# Patient Record
Sex: MALE | Race: WHITE | NOT HISPANIC OR LATINO | Employment: OTHER | URBAN - METROPOLITAN AREA
[De-identification: names, ages, dates, MRNs, and addresses within clinical notes are randomized per-mention and may not be internally consistent; named-entity substitution may affect disease eponyms.]

---

## 2023-04-04 DIAGNOSIS — K21.9 GASTROESOPHAGEAL REFLUX DISEASE WITHOUT ESOPHAGITIS: Primary | ICD-10-CM

## 2023-04-04 RX ORDER — DULOXETIN HYDROCHLORIDE 30 MG/1
1 CAPSULE, DELAYED RELEASE ORAL DAILY
COMMUNITY
Start: 2021-03-23 | End: 2023-06-29 | Stop reason: SDUPTHER

## 2023-04-04 RX ORDER — UBIDECARENONE 30 MG
1 CAPSULE ORAL DAILY
COMMUNITY
Start: 2019-09-19

## 2023-04-04 RX ORDER — CYCLOBENZAPRINE HCL 10 MG
10 TABLET ORAL NIGHTLY
COMMUNITY
Start: 2023-01-12 | End: 2023-12-27 | Stop reason: ALTCHOICE

## 2023-04-04 RX ORDER — EZETIMIBE 10 MG/1
1 TABLET ORAL NIGHTLY
COMMUNITY
Start: 2018-10-16 | End: 2024-01-10 | Stop reason: ALTCHOICE

## 2023-04-04 RX ORDER — PANTOPRAZOLE SODIUM 40 MG/1
1 TABLET, DELAYED RELEASE ORAL DAILY
COMMUNITY
Start: 2019-12-27 | End: 2023-04-04 | Stop reason: SDUPTHER

## 2023-04-04 RX ORDER — FLUTICASONE PROPIONATE 250 UG/1
POWDER, METERED RESPIRATORY (INHALATION)
COMMUNITY
Start: 2022-09-26 | End: 2024-01-10 | Stop reason: ALTCHOICE

## 2023-04-04 RX ORDER — AMLODIPINE BESYLATE 2.5 MG/1
1 TABLET ORAL DAILY
COMMUNITY
Start: 2015-06-03 | End: 2024-03-21

## 2023-04-04 RX ORDER — ARMODAFINIL 200 MG/1
1 TABLET ORAL NIGHTLY
COMMUNITY
End: 2023-12-27 | Stop reason: ALTCHOICE

## 2023-04-04 RX ORDER — MULTIVITAMIN
1 TABLET ORAL DAILY
COMMUNITY
End: 2023-12-27 | Stop reason: SDUPTHER

## 2023-04-04 RX ORDER — PRAVASTATIN SODIUM 80 MG/1
1 TABLET ORAL NIGHTLY
COMMUNITY
Start: 2019-10-31 | End: 2023-11-27 | Stop reason: SDUPTHER

## 2023-04-04 RX ORDER — TAMSULOSIN HYDROCHLORIDE 0.4 MG/1
1 CAPSULE ORAL DAILY
COMMUNITY
Start: 2018-10-16 | End: 2023-05-15 | Stop reason: SDUPTHER

## 2023-04-04 RX ORDER — EPINEPHRINE 0.22MG
AEROSOL WITH ADAPTER (ML) INHALATION
COMMUNITY
End: 2023-12-27 | Stop reason: SDUPTHER

## 2023-04-04 RX ORDER — ASPIRIN 81 MG/1
TABLET ORAL
COMMUNITY

## 2023-04-04 RX ORDER — DICYCLOMINE HYDROCHLORIDE 10 MG/1
CAPSULE ORAL
COMMUNITY
End: 2023-12-27 | Stop reason: ALTCHOICE

## 2023-04-04 RX ORDER — NAPROXEN 500 MG/1
1 TABLET ORAL 2 TIMES DAILY
COMMUNITY
Start: 2021-09-24 | End: 2024-01-10 | Stop reason: ALTCHOICE

## 2023-04-04 RX ORDER — IPRATROPIUM BROMIDE 21 UG/1
SPRAY, METERED NASAL
COMMUNITY
Start: 2022-10-12 | End: 2023-12-27 | Stop reason: ALTCHOICE

## 2023-04-04 RX ORDER — LORAZEPAM 1 MG/1
TABLET ORAL
COMMUNITY
Start: 2022-06-29 | End: 2023-12-27 | Stop reason: ALTCHOICE

## 2023-04-04 RX ORDER — ALIROCUMAB 75 MG/ML
INJECTION, SOLUTION SUBCUTANEOUS
COMMUNITY
Start: 2023-03-21 | End: 2024-01-10 | Stop reason: SDUPTHER

## 2023-04-04 RX ORDER — ALBUTEROL SULFATE 90 UG/1
AEROSOL, METERED RESPIRATORY (INHALATION)
COMMUNITY
Start: 2019-01-08 | End: 2023-12-27 | Stop reason: ALTCHOICE

## 2023-04-05 RX ORDER — PANTOPRAZOLE SODIUM 40 MG/1
40 TABLET, DELAYED RELEASE ORAL DAILY
Qty: 90 TABLET | Refills: 3 | Status: SHIPPED | OUTPATIENT
Start: 2023-04-05 | End: 2024-01-10 | Stop reason: ALTCHOICE

## 2023-05-15 DIAGNOSIS — N40.1 BENIGN PROSTATIC HYPERPLASIA WITH URINARY FREQUENCY: Primary | ICD-10-CM

## 2023-05-15 DIAGNOSIS — R35.0 BENIGN PROSTATIC HYPERPLASIA WITH URINARY FREQUENCY: Primary | ICD-10-CM

## 2023-05-15 RX ORDER — TAMSULOSIN HYDROCHLORIDE 0.4 MG/1
0.4 CAPSULE ORAL DAILY
Qty: 90 CAPSULE | Refills: 3 | Status: SHIPPED | OUTPATIENT
Start: 2023-05-15

## 2023-06-29 DIAGNOSIS — F32.A DEPRESSION, UNSPECIFIED DEPRESSION TYPE: Primary | ICD-10-CM

## 2023-06-29 RX ORDER — DULOXETIN HYDROCHLORIDE 30 MG/1
30 CAPSULE, DELAYED RELEASE ORAL DAILY
Qty: 90 CAPSULE | Refills: 3 | Status: SHIPPED | OUTPATIENT
Start: 2023-06-29

## 2023-07-28 PROBLEM — R19.7 DIARRHEA: Status: ACTIVE | Noted: 2023-07-28

## 2023-07-28 PROBLEM — J34.89 NASAL OBSTRUCTION: Status: ACTIVE | Noted: 2023-07-28

## 2023-07-28 PROBLEM — M72.2 PLANTAR FASCIITIS OF RIGHT FOOT: Status: ACTIVE | Noted: 2023-07-28

## 2023-07-28 PROBLEM — S81.811A LACERATION OF RIGHT LOWER EXTREMITY: Status: ACTIVE | Noted: 2023-07-28

## 2023-07-28 PROBLEM — K21.9 GERD (GASTROESOPHAGEAL REFLUX DISEASE): Status: ACTIVE | Noted: 2023-07-28

## 2023-07-28 PROBLEM — M54.9 RIGHT-SIDED BACK PAIN: Status: ACTIVE | Noted: 2023-07-28

## 2023-07-28 PROBLEM — S09.90XA CLOSED HEAD INJURY: Status: ACTIVE | Noted: 2023-07-28

## 2023-07-28 PROBLEM — R13.10 DYSPHAGIA: Status: ACTIVE | Noted: 2023-07-28

## 2023-07-28 PROBLEM — M77.11 LATERAL EPICONDYLITIS OF RIGHT ELBOW: Status: ACTIVE | Noted: 2023-07-28

## 2023-07-28 PROBLEM — R53.83 FATIGUE: Status: ACTIVE | Noted: 2023-07-28

## 2023-07-28 PROBLEM — E66.3 OVERWEIGHT (BMI 25.0-29.9): Status: ACTIVE | Noted: 2023-07-28

## 2023-07-28 PROBLEM — H90.3 BILATERAL SENSORINEURAL HEARING LOSS: Status: ACTIVE | Noted: 2023-07-28

## 2023-07-28 PROBLEM — R73.9 BORDERLINE HYPERGLYCEMIA: Status: ACTIVE | Noted: 2023-07-28

## 2023-07-28 PROBLEM — R00.1 BRADYCARDIA: Status: ACTIVE | Noted: 2023-07-28

## 2023-07-28 PROBLEM — R09.82 POST-NASAL DRIP: Status: ACTIVE | Noted: 2023-07-28

## 2023-07-28 PROBLEM — S32.009A CLOSED FRACTURE OF TRANSVERSE PROCESS OF LUMBAR VERTEBRA (MULTI): Status: ACTIVE | Noted: 2023-07-28

## 2023-07-28 PROBLEM — I25.10 ATHSCL HEART DISEASE OF NATIVE CORONARY ARTERY W/O ANG PCTRS: Status: ACTIVE | Noted: 2023-07-28

## 2023-07-28 PROBLEM — J34.3 NASAL TURBINATE HYPERTROPHY: Status: ACTIVE | Noted: 2023-07-28

## 2023-07-28 PROBLEM — G47.30 SLEEP APNEA: Status: ACTIVE | Noted: 2023-07-28

## 2023-07-28 PROBLEM — E78.5 HYPERLIPIDEMIA: Status: ACTIVE | Noted: 2023-07-28

## 2023-07-28 PROBLEM — J35.8 TONSIL STONE: Status: ACTIVE | Noted: 2023-07-28

## 2023-07-28 PROBLEM — G44.209 TENSION HEADACHE: Status: ACTIVE | Noted: 2023-07-28

## 2023-07-28 PROBLEM — I10 BENIGN HYPERTENSION: Status: ACTIVE | Noted: 2023-07-28

## 2023-07-28 PROBLEM — N40.0 BPH (BENIGN PROSTATIC HYPERPLASIA): Status: ACTIVE | Noted: 2023-07-28

## 2023-07-28 PROBLEM — R41.3 MEMORY LOSS OF UNKNOWN CAUSE: Status: ACTIVE | Noted: 2023-07-28

## 2023-07-28 PROBLEM — K58.9 IBS (IRRITABLE BOWEL SYNDROME): Status: ACTIVE | Noted: 2023-07-28

## 2023-07-28 PROBLEM — R09.89 CAROTID BRUIT: Status: ACTIVE | Noted: 2023-07-28

## 2023-07-28 PROBLEM — K27.9 PEPTIC ULCER: Status: ACTIVE | Noted: 2023-07-28

## 2023-07-28 PROBLEM — F32.A DEPRESSION: Status: ACTIVE | Noted: 2023-07-28

## 2023-07-28 PROBLEM — R35.0 URINARY FREQUENCY: Status: ACTIVE | Noted: 2023-07-28

## 2023-09-29 LAB
CHOLESTEROL (MG/DL) IN SER/PLAS: 125 MG/DL (ref 0–199)
CHOLESTEROL IN HDL (MG/DL) IN SER/PLAS: 55.3 MG/DL
CHOLESTEROL/HDL RATIO: 2.3
LDL: 50 MG/DL (ref 0–99)
TRIGLYCERIDE (MG/DL) IN SER/PLAS: 99 MG/DL (ref 0–149)
VLDL: 20 MG/DL (ref 0–40)

## 2023-10-05 ENCOUNTER — OFFICE VISIT (OUTPATIENT)
Dept: PRIMARY CARE | Facility: CLINIC | Age: 73
End: 2023-10-05
Payer: MEDICARE

## 2023-10-05 VITALS
BODY MASS INDEX: 26.86 KG/M2 | HEART RATE: 53 BPM | HEIGHT: 70 IN | DIASTOLIC BLOOD PRESSURE: 70 MMHG | SYSTOLIC BLOOD PRESSURE: 128 MMHG | WEIGHT: 187.6 LBS | TEMPERATURE: 97.8 F | OXYGEN SATURATION: 95 %

## 2023-10-05 DIAGNOSIS — I50.22 CHRONIC SYSTOLIC (CONGESTIVE) HEART FAILURE (MULTI): ICD-10-CM

## 2023-10-05 DIAGNOSIS — N40.1 BENIGN PROSTATIC HYPERPLASIA WITH LOWER URINARY TRACT SYMPTOMS, SYMPTOM DETAILS UNSPECIFIED: ICD-10-CM

## 2023-10-05 DIAGNOSIS — I10 BENIGN HYPERTENSION: ICD-10-CM

## 2023-10-05 DIAGNOSIS — E78.5 HYPERLIPIDEMIA, UNSPECIFIED HYPERLIPIDEMIA TYPE: ICD-10-CM

## 2023-10-05 DIAGNOSIS — Z00.00 MEDICARE ANNUAL WELLNESS VISIT, SUBSEQUENT: Primary | ICD-10-CM

## 2023-10-05 DIAGNOSIS — E66.3 OVERWEIGHT (BMI 25.0-29.9): ICD-10-CM

## 2023-10-05 DIAGNOSIS — R73.9 BORDERLINE HYPERGLYCEMIA: ICD-10-CM

## 2023-10-05 LAB
ALBUMIN SERPL BCP-MCNC: 4.5 G/DL (ref 3.4–5)
ALP SERPL-CCNC: 73 U/L (ref 33–136)
ALT SERPL W P-5'-P-CCNC: 19 U/L (ref 10–52)
ANION GAP SERPL CALC-SCNC: 13 MMOL/L (ref 10–20)
AST SERPL W P-5'-P-CCNC: 20 U/L (ref 9–39)
BASOPHILS # BLD AUTO: 0.04 X10*3/UL (ref 0–0.1)
BASOPHILS NFR BLD AUTO: 0.7 %
BILIRUB SERPL-MCNC: 0.9 MG/DL (ref 0–1.2)
BUN SERPL-MCNC: 15 MG/DL (ref 6–23)
CALCIUM SERPL-MCNC: 10 MG/DL (ref 8.6–10.6)
CHLORIDE SERPL-SCNC: 105 MMOL/L (ref 98–107)
CO2 SERPL-SCNC: 30 MMOL/L (ref 21–32)
CREAT SERPL-MCNC: 0.94 MG/DL (ref 0.5–1.3)
EOSINOPHIL # BLD AUTO: 0.15 X10*3/UL (ref 0–0.4)
EOSINOPHIL NFR BLD AUTO: 2.7 %
ERYTHROCYTE [DISTWIDTH] IN BLOOD BY AUTOMATED COUNT: 12.8 % (ref 11.5–14.5)
EST. AVERAGE GLUCOSE BLD GHB EST-MCNC: 123 MG/DL
GFR SERPL CREATININE-BSD FRML MDRD: 86 ML/MIN/1.73M*2
GLUCOSE SERPL-MCNC: 123 MG/DL (ref 74–99)
HBA1C MFR BLD: 5.9 %
HCT VFR BLD AUTO: 47.6 % (ref 41–52)
HGB BLD-MCNC: 15.3 G/DL (ref 13.5–17.5)
IMM GRANULOCYTES # BLD AUTO: 0 X10*3/UL (ref 0–0.5)
IMM GRANULOCYTES NFR BLD AUTO: 0 % (ref 0–0.9)
LYMPHOCYTES # BLD AUTO: 1.52 X10*3/UL (ref 0.8–3)
LYMPHOCYTES NFR BLD AUTO: 27.4 %
MCH RBC QN AUTO: 30.8 PG (ref 26–34)
MCHC RBC AUTO-ENTMCNC: 32.1 G/DL (ref 32–36)
MCV RBC AUTO: 96 FL (ref 80–100)
MONOCYTES # BLD AUTO: 0.49 X10*3/UL (ref 0.05–0.8)
MONOCYTES NFR BLD AUTO: 8.8 %
MUCOUS THREADS #/AREA URNS AUTO: NORMAL /LPF
NEUTROPHILS # BLD AUTO: 3.35 X10*3/UL (ref 1.6–5.5)
NEUTROPHILS NFR BLD AUTO: 60.4 %
NRBC BLD-RTO: 0 /100 WBCS (ref 0–0)
PLATELET # BLD AUTO: 194 X10*3/UL (ref 150–450)
PMV BLD AUTO: 10.4 FL (ref 7.5–11.5)
POTASSIUM SERPL-SCNC: 4.5 MMOL/L (ref 3.5–5.3)
PROT SERPL-MCNC: 7.1 G/DL (ref 6.4–8.2)
PSA SERPL-MCNC: 1.32 NG/ML
RBC # BLD AUTO: 4.96 X10*6/UL (ref 4.5–5.9)
RBC #/AREA URNS AUTO: NORMAL /HPF
SODIUM SERPL-SCNC: 143 MMOL/L (ref 136–145)
WBC # BLD AUTO: 5.6 X10*3/UL (ref 4.4–11.3)
WBC #/AREA URNS AUTO: NORMAL /HPF

## 2023-10-05 PROCEDURE — G0439 PPPS, SUBSEQ VISIT: HCPCS | Performed by: FAMILY MEDICINE

## 2023-10-05 PROCEDURE — 99497 ADVNCD CARE PLAN 30 MIN: CPT | Performed by: FAMILY MEDICINE

## 2023-10-05 PROCEDURE — 84153 ASSAY OF PSA TOTAL: CPT

## 2023-10-05 PROCEDURE — 1159F MED LIST DOCD IN RCRD: CPT | Performed by: FAMILY MEDICINE

## 2023-10-05 PROCEDURE — 86803 HEPATITIS C AB TEST: CPT

## 2023-10-05 PROCEDURE — 99397 PER PM REEVAL EST PAT 65+ YR: CPT | Performed by: FAMILY MEDICINE

## 2023-10-05 PROCEDURE — 85025 COMPLETE CBC W/AUTO DIFF WBC: CPT

## 2023-10-05 PROCEDURE — 80053 COMPREHEN METABOLIC PANEL: CPT

## 2023-10-05 PROCEDURE — 36415 COLL VENOUS BLD VENIPUNCTURE: CPT

## 2023-10-05 PROCEDURE — 1158F ADVNC CARE PLAN TLK DOCD: CPT | Performed by: FAMILY MEDICINE

## 2023-10-05 PROCEDURE — 1170F FXNL STATUS ASSESSED: CPT | Performed by: FAMILY MEDICINE

## 2023-10-05 PROCEDURE — 1036F TOBACCO NON-USER: CPT | Performed by: FAMILY MEDICINE

## 2023-10-05 PROCEDURE — 83036 HEMOGLOBIN GLYCOSYLATED A1C: CPT

## 2023-10-05 PROCEDURE — 1160F RVW MEDS BY RX/DR IN RCRD: CPT | Performed by: FAMILY MEDICINE

## 2023-10-05 PROCEDURE — 3074F SYST BP LT 130 MM HG: CPT | Performed by: FAMILY MEDICINE

## 2023-10-05 PROCEDURE — 3078F DIAST BP <80 MM HG: CPT | Performed by: FAMILY MEDICINE

## 2023-10-05 PROCEDURE — 81001 URINALYSIS AUTO W/SCOPE: CPT

## 2023-10-05 PROCEDURE — 1125F AMNT PAIN NOTED PAIN PRSNT: CPT | Performed by: FAMILY MEDICINE

## 2023-10-05 RX ORDER — FAMOTIDINE 20 MG/1
20 TABLET, FILM COATED ORAL
COMMUNITY
Start: 2018-11-01 | End: 2023-12-27 | Stop reason: ALTCHOICE

## 2023-10-05 SDOH — ECONOMIC STABILITY: TRANSPORTATION INSECURITY
IN THE PAST 12 MONTHS, HAS THE LACK OF TRANSPORTATION KEPT YOU FROM MEDICAL APPOINTMENTS OR FROM GETTING MEDICATIONS?: NO

## 2023-10-05 SDOH — ECONOMIC STABILITY: FOOD INSECURITY: WITHIN THE PAST 12 MONTHS, THE FOOD YOU BOUGHT JUST DIDN'T LAST AND YOU DIDN'T HAVE MONEY TO GET MORE.: NEVER TRUE

## 2023-10-05 SDOH — ECONOMIC STABILITY: INCOME INSECURITY: IN THE LAST 12 MONTHS, WAS THERE A TIME WHEN YOU WERE NOT ABLE TO PAY THE MORTGAGE OR RENT ON TIME?: NO

## 2023-10-05 SDOH — ECONOMIC STABILITY: FOOD INSECURITY: WITHIN THE PAST 12 MONTHS, YOU WORRIED THAT YOUR FOOD WOULD RUN OUT BEFORE YOU GOT MONEY TO BUY MORE.: NEVER TRUE

## 2023-10-05 SDOH — ECONOMIC STABILITY: HOUSING INSECURITY
IN THE LAST 12 MONTHS, WAS THERE A TIME WHEN YOU DID NOT HAVE A STEADY PLACE TO SLEEP OR SLEPT IN A SHELTER (INCLUDING NOW)?: NO

## 2023-10-05 SDOH — ECONOMIC STABILITY: TRANSPORTATION INSECURITY
IN THE PAST 12 MONTHS, HAS LACK OF TRANSPORTATION KEPT YOU FROM MEETINGS, WORK, OR FROM GETTING THINGS NEEDED FOR DAILY LIVING?: NO

## 2023-10-05 ASSESSMENT — ENCOUNTER SYMPTOMS
PSYCHIATRIC NEGATIVE: 1
RESPIRATORY NEGATIVE: 1
GASTROINTESTINAL NEGATIVE: 1
OCCASIONAL FEELINGS OF UNSTEADINESS: 0
ENDOCRINE NEGATIVE: 1
BACK PAIN: 1
LOSS OF SENSATION IN FEET: 0
DEPRESSION: 0
NEUROLOGICAL NEGATIVE: 1
CONSTITUTIONAL NEGATIVE: 1

## 2023-10-05 ASSESSMENT — ANXIETY QUESTIONNAIRES
3. WORRYING TOO MUCH ABOUT DIFFERENT THINGS: NOT AT ALL
2. NOT BEING ABLE TO STOP OR CONTROL WORRYING: NOT AT ALL
5. BEING SO RESTLESS THAT IT IS HARD TO SIT STILL: NOT AT ALL
GAD7 TOTAL SCORE: 0
6. BECOMING EASILY ANNOYED OR IRRITABLE: NOT AT ALL
1. FEELING NERVOUS, ANXIOUS, OR ON EDGE: NOT AT ALL
4. TROUBLE RELAXING: NOT AT ALL
7. FEELING AFRAID AS IF SOMETHING AWFUL MIGHT HAPPEN: NOT AT ALL

## 2023-10-05 ASSESSMENT — LIFESTYLE VARIABLES
SKIP TO QUESTIONS 9-10: 1
HOW MANY STANDARD DRINKS CONTAINING ALCOHOL DO YOU HAVE ON A TYPICAL DAY: PATIENT DOES NOT DRINK
HOW OFTEN DO YOU HAVE SIX OR MORE DRINKS ON ONE OCCASION: NEVER
AUDIT-C TOTAL SCORE: 0
HOW OFTEN DO YOU HAVE A DRINK CONTAINING ALCOHOL: NEVER

## 2023-10-05 ASSESSMENT — ACTIVITIES OF DAILY LIVING (ADL)
MANAGING FINANCES: INDEPENDENT
TAKING MEDICATION: INDEPENDENT
NEEDS ASSISTANCE WITH FOOD: INDEPENDENT
ADEQUATE_TO_COMPLETE_ADL: YES
DOING HOUSEWORK: INDEPENDENT
WALKS IN HOME: INDEPENDENT
USING TELEPHONE: INDEPENDENT
GROOMING: INDEPENDENT
TOILETING: INDEPENDENT
GROCERY SHOPPING: INDEPENDENT
FEEDING YOURSELF: INDEPENDENT
BATHING: INDEPENDENT
PATIENT'S MEMORY ADEQUATE TO SAFELY COMPLETE DAILY ACTIVITIES?: YES
DRESSING YOURSELF: INDEPENDENT
STIL DRIVING: YES
PREPARING MEALS: INDEPENDENT
JUDGMENT_ADEQUATE_SAFELY_COMPLETE_DAILY_ACTIVITIES: YES
EATING: INDEPENDENT
USING TRANSPORTATION: INDEPENDENT

## 2023-10-05 ASSESSMENT — SOCIAL DETERMINANTS OF HEALTH (SDOH)
WITHIN THE LAST YEAR, HAVE TO BEEN RAPED OR FORCED TO HAVE ANY KIND OF SEXUAL ACTIVITY BY YOUR PARTNER OR EX-PARTNER?: NO
IN THE PAST 12 MONTHS, HAS THE ELECTRIC, GAS, OIL, OR WATER COMPANY THREATENED TO SHUT OFF SERVICE IN YOUR HOME?: NO
WITHIN THE LAST YEAR, HAVE YOU BEEN KICKED, HIT, SLAPPED, OR OTHERWISE PHYSICALLY HURT BY YOUR PARTNER OR EX-PARTNER?: NO
HOW HARD IS IT FOR YOU TO PAY FOR THE VERY BASICS LIKE FOOD, HOUSING, MEDICAL CARE, AND HEATING?: NOT HARD AT ALL
WITHIN THE LAST YEAR, HAVE YOU BEEN AFRAID OF YOUR PARTNER OR EX-PARTNER?: NO
WITHIN THE LAST YEAR, HAVE YOU BEEN HUMILIATED OR EMOTIONALLY ABUSED IN OTHER WAYS BY YOUR PARTNER OR EX-PARTNER?: NO

## 2023-10-05 ASSESSMENT — PAIN SCALES - GENERAL: PAINLEVEL: 2

## 2023-10-05 ASSESSMENT — GERIATRIC MINI NUTRITIONAL ASSESSMENT (MNA)
D HAS SUFFERED PSYCHOLOGICAL STRESS OR ACUTE DISEASE IN THE PAST 3 MONTHS?: NO
C GENERAL MOBILITY: GOES OUT
E NEUROPSYCHOLOGICAL PROBLEMS: NO PSYCHOLOGICAL PROBLEMS
A HAS FOOD INTAKE DECLINED OVER THE PAST 3 MONTHS DUE TO LOSS OF APPETITE, DIGESTIVE PROBLEMS, CHEWING OR SWALLOWING DIFFICULTIES?: NO DECREASE IN FOOD INTAKE
B WEIGHT LOSS DURING THE LAST 3 MONTHS: NO WEIGHT LOSS

## 2023-10-05 ASSESSMENT — PATIENT HEALTH QUESTIONNAIRE - PHQ9
1. LITTLE INTEREST OR PLEASURE IN DOING THINGS: NOT AT ALL
2. FEELING DOWN, DEPRESSED OR HOPELESS: NOT AT ALL
SUM OF ALL RESPONSES TO PHQ9 QUESTIONS 1 & 2: 0

## 2023-10-05 NOTE — ACP (ADVANCE CARE PLANNING)
Confirming Previous Code Status:   Advance Care Planning Note     Discussion Date: 10/05/23   Discussion Participants: patient    The patient wishes to discuss Advance Care Planning today and the following is a brief summary of our discussion.     Patient has capacity to make their own medical decisions: Yes  Health Care Agent/Surrogate Decision Maker documented in chart: Yes    Documents on file and valid:  Advance Directive/Living Will: Yes   Health Care Power of : Yes  Other: none    Communication of Medical Status/Prognosis:   yes     Communication of Treatment Goals/Options:   no     Treatment Decisions  Goals of Care: survival is paramount regardless of prognosis, treatment outcome, or burden   yes  Follow Up Plan  no  Team Members  myself  Time Statement: Total face to face time spent on advance care planning was 16 minutes with 16 minutes spent in counseling, including the explanation.    Andrez Leyva DO  10/5/2023 9:57 AM

## 2023-10-05 NOTE — PROGRESS NOTES
"Subjective   Patient ID: Lucho Alexander is a 72 y.o. male who presents for Annual Exam (Annual medicare wellness fasting bw).    HPI     Review of Systems   Constitutional: Negative.    HENT: Negative.     Respiratory: Negative.     Cardiovascular:         Dr Rico cardiac   Gastrointestinal: Negative.    Endocrine: Negative.    Genitourinary: Negative.    Musculoskeletal:  Positive for back pain.   Neurological: Negative.    Psychiatric/Behavioral: Negative.         Objective   /70 (BP Location: Left arm)   Pulse 53   Temp 36.6 °C (97.8 °F) (Temporal)   Ht 1.778 m (5' 10\")   Wt 85.1 kg (187 lb 9.6 oz)   SpO2 95%   BMI 26.92 kg/m²     Physical Exam  Vitals and nursing note reviewed.   HENT:      Right Ear: Tympanic membrane normal.      Left Ear: Tympanic membrane normal.      Mouth/Throat:      Pharynx: Oropharynx is clear.   Cardiovascular:      Rate and Rhythm: Normal rate and regular rhythm.      Pulses: Normal pulses.      Heart sounds: Normal heart sounds.   Pulmonary:      Breath sounds: Normal breath sounds.   Abdominal:      Palpations: Abdomen is soft.   Musculoskeletal:         General: Normal range of motion.   Neurological:      General: No focal deficit present.      Mental Status: He is alert and oriented to person, place, and time.   Psychiatric:         Mood and Affect: Mood normal.         Behavior: Behavior normal.         Assessment/Plan patient seen here for an annual Medicare wellness exam.  Reviewed his questionnaire he is agreeable to his responses.  We did discuss advanced directives.  He has no significant difficulty depression or anxiety.  He continues to follow with cardiology.  We are drawing his other lab work here today.  We will see him back in a year or sooner if there is any problem  Problem List Items Addressed This Visit             ICD-10-CM    Benign hypertension I10    Relevant Orders    CBC and Auto Differential    Comprehensive Metabolic Panel    Urinalysis " Microscopic Only    Borderline hyperglycemia R73.9    Relevant Orders    Hemoglobin A1C    BPH (benign prostatic hyperplasia) N40.0    Relevant Orders    Prostate Specific Antigen    Hyperlipidemia E78.5    Overweight (BMI 25.0-29.9) E66.3    Chronic systolic (congestive) heart failure (CMS/HCC) I50.22     Other Visit Diagnoses         Codes    Medicare annual wellness visit, subsequent    -  Primary Z00.00    Relevant Orders    Hepatitis C antibody

## 2023-10-06 ENCOUNTER — SPECIALTY PHARMACY (OUTPATIENT)
Dept: PHARMACY | Facility: CLINIC | Age: 73
End: 2023-10-06

## 2023-10-06 ENCOUNTER — PHARMACY VISIT (OUTPATIENT)
Dept: PHARMACY | Facility: CLINIC | Age: 73
End: 2023-10-06
Payer: MEDICARE

## 2023-10-06 LAB — HCV AB SER QL: NONREACTIVE

## 2023-10-06 PROCEDURE — RXMED WILLOW AMBULATORY MEDICATION CHARGE

## 2023-10-06 NOTE — PROGRESS NOTES
Patient called regarding delivery. I emailed home delivery for same day and will contact pt with updates.     Oscar Calabrese  Specialty Pharmacy Technician

## 2023-10-06 NOTE — PROGRESS NOTES
Praluent delivery shipped per [facility]. Original request in [employer] prior to [employer].

## 2023-10-06 NOTE — PROGRESS NOTES
Patient address: [place]    Patient Medications: Praluent   Medication PICKUP: Friday 10/06/2023          Oscar Calabrese  Specialty Pharmacy Technician

## 2023-11-22 DIAGNOSIS — E78.5 HYPERLIPIDEMIA, UNSPECIFIED: ICD-10-CM

## 2023-11-27 RX ORDER — PRAVASTATIN SODIUM 80 MG/1
80 TABLET ORAL NIGHTLY
Qty: 90 TABLET | Refills: 0 | Status: SHIPPED | OUTPATIENT
Start: 2023-11-27 | End: 2024-02-21

## 2023-12-15 ENCOUNTER — SPECIALTY PHARMACY (OUTPATIENT)
Dept: PHARMACY | Facility: CLINIC | Age: 73
End: 2023-12-15

## 2023-12-15 PROCEDURE — RXMED WILLOW AMBULATORY MEDICATION CHARGE

## 2023-12-18 ENCOUNTER — PHARMACY VISIT (OUTPATIENT)
Dept: PHARMACY | Facility: CLINIC | Age: 73
End: 2023-12-18
Payer: MEDICARE

## 2023-12-26 ENCOUNTER — APPOINTMENT (OUTPATIENT)
Dept: RADIOLOGY | Facility: HOSPITAL | Age: 73
End: 2023-12-26
Payer: MEDICARE

## 2023-12-26 LAB
ALBUMIN SERPL BCP-MCNC: 4.3 G/DL (ref 3.4–5)
ALP SERPL-CCNC: 81 U/L (ref 33–136)
ALT SERPL W P-5'-P-CCNC: 21 U/L (ref 10–52)
ANION GAP SERPL CALC-SCNC: 11 MMOL/L (ref 10–20)
AST SERPL W P-5'-P-CCNC: 25 U/L (ref 9–39)
BASOPHILS # BLD AUTO: 0.05 X10*3/UL (ref 0–0.1)
BASOPHILS NFR BLD AUTO: 0.7 %
BILIRUB SERPL-MCNC: 0.8 MG/DL (ref 0–1.2)
BNP SERPL-MCNC: 49 PG/ML (ref 0–99)
BUN SERPL-MCNC: 16 MG/DL (ref 6–23)
CALCIUM SERPL-MCNC: 9 MG/DL (ref 8.6–10.3)
CARDIAC TROPONIN I PNL SERPL HS: 5 NG/L (ref 0–20)
CARDIAC TROPONIN I PNL SERPL HS: 5 NG/L (ref 0–20)
CHLORIDE SERPL-SCNC: 104 MMOL/L (ref 98–107)
CO2 SERPL-SCNC: 29 MMOL/L (ref 21–32)
CREAT SERPL-MCNC: 0.8 MG/DL (ref 0.5–1.3)
D DIMER PPP FEU-MCNC: <215 NG/ML FEU
EOSINOPHIL # BLD AUTO: 0.12 X10*3/UL (ref 0–0.4)
EOSINOPHIL NFR BLD AUTO: 1.7 %
ERYTHROCYTE [DISTWIDTH] IN BLOOD BY AUTOMATED COUNT: 12 % (ref 11.5–14.5)
GFR SERPL CREATININE-BSD FRML MDRD: >90 ML/MIN/1.73M*2
GLUCOSE SERPL-MCNC: 99 MG/DL (ref 74–99)
HCT VFR BLD AUTO: 46.2 % (ref 41–52)
HGB BLD-MCNC: 15.9 G/DL (ref 13.5–17.5)
IMM GRANULOCYTES # BLD AUTO: 0.02 X10*3/UL (ref 0–0.5)
IMM GRANULOCYTES NFR BLD AUTO: 0.3 % (ref 0–0.9)
LIPASE SERPL-CCNC: 14 U/L (ref 9–82)
LYMPHOCYTES # BLD AUTO: 1.75 X10*3/UL (ref 0.8–3)
LYMPHOCYTES NFR BLD AUTO: 24.1 %
MAGNESIUM SERPL-MCNC: 2.06 MG/DL (ref 1.6–2.4)
MCH RBC QN AUTO: 31.8 PG (ref 26–34)
MCHC RBC AUTO-ENTMCNC: 34.4 G/DL (ref 32–36)
MCV RBC AUTO: 92 FL (ref 80–100)
MONOCYTES # BLD AUTO: 0.51 X10*3/UL (ref 0.05–0.8)
MONOCYTES NFR BLD AUTO: 7 %
NEUTROPHILS # BLD AUTO: 4.82 X10*3/UL (ref 1.6–5.5)
NEUTROPHILS NFR BLD AUTO: 66.2 %
NRBC BLD-RTO: 0 /100 WBCS (ref 0–0)
PLATELET # BLD AUTO: 201 X10*3/UL (ref 150–450)
POTASSIUM SERPL-SCNC: 4 MMOL/L (ref 3.5–5.3)
PROT SERPL-MCNC: 7 G/DL (ref 6.4–8.2)
RBC # BLD AUTO: 5 X10*6/UL (ref 4.5–5.9)
SODIUM SERPL-SCNC: 140 MMOL/L (ref 136–145)
WBC # BLD AUTO: 7.3 X10*3/UL (ref 4.4–11.3)

## 2023-12-26 PROCEDURE — 83735 ASSAY OF MAGNESIUM: CPT | Performed by: HEALTH CARE PROVIDER

## 2023-12-26 PROCEDURE — 83690 ASSAY OF LIPASE: CPT | Performed by: HEALTH CARE PROVIDER

## 2023-12-26 PROCEDURE — 36415 COLL VENOUS BLD VENIPUNCTURE: CPT | Performed by: HEALTH CARE PROVIDER

## 2023-12-26 PROCEDURE — 84484 ASSAY OF TROPONIN QUANT: CPT | Performed by: HEALTH CARE PROVIDER

## 2023-12-26 PROCEDURE — 85379 FIBRIN DEGRADATION QUANT: CPT | Performed by: HEALTH CARE PROVIDER

## 2023-12-26 PROCEDURE — 84075 ASSAY ALKALINE PHOSPHATASE: CPT | Performed by: HEALTH CARE PROVIDER

## 2023-12-26 PROCEDURE — 99285 EMERGENCY DEPT VISIT HI MDM: CPT | Mod: 25 | Performed by: STUDENT IN AN ORGANIZED HEALTH CARE EDUCATION/TRAINING PROGRAM

## 2023-12-26 PROCEDURE — 85025 COMPLETE CBC W/AUTO DIFF WBC: CPT | Performed by: HEALTH CARE PROVIDER

## 2023-12-26 PROCEDURE — 96372 THER/PROPH/DIAG INJ SC/IM: CPT

## 2023-12-26 PROCEDURE — 83036 HEMOGLOBIN GLYCOSYLATED A1C: CPT | Performed by: INTERNAL MEDICINE

## 2023-12-26 PROCEDURE — 71045 X-RAY EXAM CHEST 1 VIEW: CPT | Mod: FR

## 2023-12-26 PROCEDURE — 80061 LIPID PANEL: CPT | Performed by: INTERNAL MEDICINE

## 2023-12-26 PROCEDURE — 83880 ASSAY OF NATRIURETIC PEPTIDE: CPT | Performed by: HEALTH CARE PROVIDER

## 2023-12-26 PROCEDURE — 71045 X-RAY EXAM CHEST 1 VIEW: CPT | Mod: FOREIGN READ | Performed by: RADIOLOGY

## 2023-12-26 ASSESSMENT — COLUMBIA-SUICIDE SEVERITY RATING SCALE - C-SSRS
2. HAVE YOU ACTUALLY HAD ANY THOUGHTS OF KILLING YOURSELF?: NO
6. HAVE YOU EVER DONE ANYTHING, STARTED TO DO ANYTHING, OR PREPARED TO DO ANYTHING TO END YOUR LIFE?: NO
1. IN THE PAST MONTH, HAVE YOU WISHED YOU WERE DEAD OR WISHED YOU COULD GO TO SLEEP AND NOT WAKE UP?: NO

## 2023-12-26 NOTE — ED TRIAGE NOTES
73-year-old male with history of chronic systolic congestive heart failure hypertension BPH hyperlipidemia GERD presents ED complaining of substernal and epigastric chest pain over the past couple of days, denies any associated fever, chills no nausea vomiting, he denies any associated headache, dizziness or cervical neck tenderness no hemoptysis or hematemesis.  He notes increased tenderness with trying to take a deep inspiration        General: Vitals noted, no distress. Afebrile. Alert and oriented  x 3.     EENT: TMs clear. Posterior oropharynx unremarkable. No meningismus. No LAD.     Cardiac: Regular, rate, rhythm, no murmurs rubs or gallops.     Pulmonary: Lungs clear bilaterally with good aeration. No adventitious breath sounds. No wheezes rales or rhonchi.     Abdomen: Soft, nonsurgical. Nontender. No peritoneal signs. Normoactive bowel sounds. No pulsatile masses.     Extremities: No peripheral edema. Negative Homans bilaterally, no cords.    Skin: No rash. Intact.     Neuro: No focal neurologic deficits, NIH score of 0. Cranial nerves normal as tested from II through XII.

## 2023-12-27 ENCOUNTER — APPOINTMENT (OUTPATIENT)
Dept: CARDIOLOGY | Facility: HOSPITAL | Age: 73
End: 2023-12-27
Payer: MEDICARE

## 2023-12-27 ENCOUNTER — HOSPITAL ENCOUNTER (OUTPATIENT)
Facility: HOSPITAL | Age: 73
Setting detail: OBSERVATION
Discharge: HOME | End: 2023-12-27
Attending: STUDENT IN AN ORGANIZED HEALTH CARE EDUCATION/TRAINING PROGRAM | Admitting: INTERNAL MEDICINE
Payer: MEDICARE

## 2023-12-27 VITALS
HEIGHT: 70 IN | RESPIRATION RATE: 16 BRPM | SYSTOLIC BLOOD PRESSURE: 169 MMHG | DIASTOLIC BLOOD PRESSURE: 82 MMHG | WEIGHT: 180 LBS | HEART RATE: 63 BPM | TEMPERATURE: 97.3 F | BODY MASS INDEX: 25.77 KG/M2 | OXYGEN SATURATION: 96 %

## 2023-12-27 DIAGNOSIS — I15.9 SECONDARY HYPERTENSION: ICD-10-CM

## 2023-12-27 DIAGNOSIS — R07.9 CHEST PAIN, UNSPECIFIED TYPE: Primary | ICD-10-CM

## 2023-12-27 LAB
ANION GAP SERPL CALC-SCNC: 8 MMOL/L (ref 10–20)
AORTIC VALVE PEAK VELOCITY: 1.26
AV PEAK GRADIENT: 6.4
AVA (PEAK VEL): 2.89
BUN SERPL-MCNC: 16 MG/DL (ref 6–23)
CALCIUM SERPL-MCNC: 9.2 MG/DL (ref 8.6–10.3)
CARDIAC TROPONIN I PNL SERPL HS: 6 NG/L (ref 0–20)
CHLORIDE SERPL-SCNC: 104 MMOL/L (ref 98–107)
CHOLEST SERPL-MCNC: 117 MG/DL (ref 0–199)
CHOLESTEROL/HDL RATIO: 1.8
CO2 SERPL-SCNC: 33 MMOL/L (ref 21–32)
CREAT SERPL-MCNC: 0.89 MG/DL (ref 0.5–1.3)
EJECTION FRACTION APICAL 4 CHAMBER: 57.9
EJECTION FRACTION: 58
ERYTHROCYTE [DISTWIDTH] IN BLOOD BY AUTOMATED COUNT: 12.1 % (ref 11.5–14.5)
EST. AVERAGE GLUCOSE BLD GHB EST-MCNC: 117 MG/DL
GFR SERPL CREATININE-BSD FRML MDRD: 90 ML/MIN/1.73M*2
GLOBAL LONGITUDINAL STRAIN: 16.6
GLUCOSE SERPL-MCNC: 118 MG/DL (ref 74–99)
HBA1C MFR BLD: 5.7 %
HCT VFR BLD AUTO: 44.3 % (ref 41–52)
HDLC SERPL-MCNC: 64.3 MG/DL
HGB BLD-MCNC: 15.2 G/DL (ref 13.5–17.5)
LDLC SERPL CALC-MCNC: 35 MG/DL
LEFT VENTRICLE INTERNAL DIMENSION DIASTOLE: 4.5 (ref 3.5–6)
LEFT VENTRICULAR OUTFLOW TRACT DIAMETER: 2.1
MCH RBC QN AUTO: 31.7 PG (ref 26–34)
MCHC RBC AUTO-ENTMCNC: 34.3 G/DL (ref 32–36)
MCV RBC AUTO: 92 FL (ref 80–100)
MITRAL VALVE E/A RATIO: 0.76
NON HDL CHOLESTEROL: 53 MG/DL (ref 0–149)
NRBC BLD-RTO: 0 /100 WBCS (ref 0–0)
PLATELET # BLD AUTO: 186 X10*3/UL (ref 150–450)
POTASSIUM SERPL-SCNC: 3.5 MMOL/L (ref 3.5–5.3)
RBC # BLD AUTO: 4.8 X10*6/UL (ref 4.5–5.9)
RIGHT VENTRICLE FREE WALL PEAK S': 12.6
RIGHT VENTRICLE PEAK SYSTOLIC PRESSURE: 25.5
SODIUM SERPL-SCNC: 141 MMOL/L (ref 136–145)
TRICUSPID ANNULAR PLANE SYSTOLIC EXCURSION: 2.3
TRIGL SERPL-MCNC: 91 MG/DL (ref 0–149)
VLDL: 18 MG/DL (ref 0–40)
WBC # BLD AUTO: 7.6 X10*3/UL (ref 4.4–11.3)

## 2023-12-27 PROCEDURE — G0378 HOSPITAL OBSERVATION PER HR: HCPCS

## 2023-12-27 PROCEDURE — 84484 ASSAY OF TROPONIN QUANT: CPT | Performed by: INTERNAL MEDICINE

## 2023-12-27 PROCEDURE — 2500000001 HC RX 250 WO HCPCS SELF ADMINISTERED DRUGS (ALT 637 FOR MEDICARE OP): Performed by: STUDENT IN AN ORGANIZED HEALTH CARE EDUCATION/TRAINING PROGRAM

## 2023-12-27 PROCEDURE — 2500000004 HC RX 250 GENERAL PHARMACY W/ HCPCS (ALT 636 FOR OP/ED): Performed by: INTERNAL MEDICINE

## 2023-12-27 PROCEDURE — 36415 COLL VENOUS BLD VENIPUNCTURE: CPT | Performed by: INTERNAL MEDICINE

## 2023-12-27 PROCEDURE — 93005 ELECTROCARDIOGRAM TRACING: CPT

## 2023-12-27 PROCEDURE — 93356 MYOCRD STRAIN IMG SPCKL TRCK: CPT

## 2023-12-27 PROCEDURE — 2500000001 HC RX 250 WO HCPCS SELF ADMINISTERED DRUGS (ALT 637 FOR MEDICARE OP): Performed by: INTERNAL MEDICINE

## 2023-12-27 PROCEDURE — 99223 1ST HOSP IP/OBS HIGH 75: CPT | Performed by: INTERNAL MEDICINE

## 2023-12-27 PROCEDURE — 93306 TTE W/DOPPLER COMPLETE: CPT | Performed by: INTERNAL MEDICINE

## 2023-12-27 PROCEDURE — 82374 ASSAY BLOOD CARBON DIOXIDE: CPT | Performed by: INTERNAL MEDICINE

## 2023-12-27 PROCEDURE — 94660 CPAP INITIATION&MGMT: CPT

## 2023-12-27 PROCEDURE — 2500000004 HC RX 250 GENERAL PHARMACY W/ HCPCS (ALT 636 FOR OP/ED): Mod: MUE | Performed by: STUDENT IN AN ORGANIZED HEALTH CARE EDUCATION/TRAINING PROGRAM

## 2023-12-27 PROCEDURE — 85027 COMPLETE CBC AUTOMATED: CPT | Performed by: INTERNAL MEDICINE

## 2023-12-27 PROCEDURE — 99497 ADVNCD CARE PLAN 30 MIN: CPT | Performed by: INTERNAL MEDICINE

## 2023-12-27 PROCEDURE — 93356 MYOCRD STRAIN IMG SPCKL TRCK: CPT | Performed by: INTERNAL MEDICINE

## 2023-12-27 PROCEDURE — 99238 HOSP IP/OBS DSCHRG MGMT 30/<: CPT | Performed by: STUDENT IN AN ORGANIZED HEALTH CARE EDUCATION/TRAINING PROGRAM

## 2023-12-27 PROCEDURE — 99222 1ST HOSP IP/OBS MODERATE 55: CPT | Performed by: INTERNAL MEDICINE

## 2023-12-27 RX ORDER — POLYETHYLENE GLYCOL 3350 17 G/17G
17 POWDER, FOR SOLUTION ORAL DAILY PRN
Status: DISCONTINUED | OUTPATIENT
Start: 2023-12-27 | End: 2023-12-27 | Stop reason: HOSPADM

## 2023-12-27 RX ORDER — EZETIMIBE 10 MG/1
10 TABLET ORAL NIGHTLY
Status: DISCONTINUED | OUTPATIENT
Start: 2023-12-27 | End: 2023-12-27 | Stop reason: HOSPADM

## 2023-12-27 RX ORDER — ENOXAPARIN SODIUM 100 MG/ML
40 INJECTION SUBCUTANEOUS EVERY 24 HOURS
Status: DISCONTINUED | OUTPATIENT
Start: 2023-12-27 | End: 2023-12-27 | Stop reason: HOSPADM

## 2023-12-27 RX ORDER — ASPIRIN 81 MG/1
81 TABLET ORAL DAILY
Status: DISCONTINUED | OUTPATIENT
Start: 2023-12-27 | End: 2023-12-27 | Stop reason: HOSPADM

## 2023-12-27 RX ORDER — ACETAMINOPHEN 650 MG/1
650 SUPPOSITORY RECTAL EVERY 6 HOURS PRN
Status: DISCONTINUED | OUTPATIENT
Start: 2023-12-27 | End: 2023-12-27 | Stop reason: HOSPADM

## 2023-12-27 RX ORDER — DULOXETIN HYDROCHLORIDE 30 MG/1
30 CAPSULE, DELAYED RELEASE ORAL DAILY
Status: DISCONTINUED | OUTPATIENT
Start: 2023-12-27 | End: 2023-12-27 | Stop reason: HOSPADM

## 2023-12-27 RX ORDER — PRAVASTATIN SODIUM 20 MG/1
80 TABLET ORAL NIGHTLY
Status: DISCONTINUED | OUTPATIENT
Start: 2023-12-27 | End: 2023-12-27 | Stop reason: HOSPADM

## 2023-12-27 RX ORDER — TALC
3 POWDER (GRAM) TOPICAL NIGHTLY PRN
Status: DISCONTINUED | OUTPATIENT
Start: 2023-12-27 | End: 2023-12-27 | Stop reason: HOSPADM

## 2023-12-27 RX ORDER — PANTOPRAZOLE SODIUM 40 MG/1
40 TABLET, DELAYED RELEASE ORAL DAILY
Status: DISCONTINUED | OUTPATIENT
Start: 2023-12-27 | End: 2023-12-27 | Stop reason: HOSPADM

## 2023-12-27 RX ORDER — TAMSULOSIN HYDROCHLORIDE 0.4 MG/1
0.4 CAPSULE ORAL DAILY
Status: DISCONTINUED | OUTPATIENT
Start: 2023-12-27 | End: 2023-12-27 | Stop reason: HOSPADM

## 2023-12-27 RX ORDER — AMLODIPINE BESYLATE 5 MG/1
2.5 TABLET ORAL DAILY
Status: DISCONTINUED | OUTPATIENT
Start: 2023-12-27 | End: 2023-12-27 | Stop reason: HOSPADM

## 2023-12-27 RX ORDER — ALUMINUM HYDROXIDE, MAGNESIUM HYDROXIDE, AND SIMETHICONE 1200; 120; 1200 MG/30ML; MG/30ML; MG/30ML
30 SUSPENSION ORAL EVERY 6 HOURS PRN
Status: DISCONTINUED | OUTPATIENT
Start: 2023-12-27 | End: 2023-12-27 | Stop reason: HOSPADM

## 2023-12-27 RX ORDER — ACETAMINOPHEN 160 MG/5ML
650 SOLUTION ORAL EVERY 6 HOURS PRN
Status: DISCONTINUED | OUTPATIENT
Start: 2023-12-27 | End: 2023-12-27 | Stop reason: HOSPADM

## 2023-12-27 RX ORDER — ACETAMINOPHEN 325 MG/1
650 TABLET ORAL EVERY 6 HOURS PRN
Status: DISCONTINUED | OUTPATIENT
Start: 2023-12-27 | End: 2023-12-27 | Stop reason: HOSPADM

## 2023-12-27 RX ORDER — NITROGLYCERIN 0.4 MG/1
0.4 TABLET SUBLINGUAL EVERY 5 MIN PRN
Status: DISCONTINUED | OUTPATIENT
Start: 2023-12-27 | End: 2023-12-27 | Stop reason: HOSPADM

## 2023-12-27 RX ADMIN — TAMSULOSIN HYDROCHLORIDE 0.4 MG: 0.4 CAPSULE ORAL at 12:43

## 2023-12-27 RX ADMIN — PANTOPRAZOLE SODIUM 40 MG: 40 TABLET, DELAYED RELEASE ORAL at 12:43

## 2023-12-27 RX ADMIN — DULOXETINE HYDROCHLORIDE 30 MG: 30 CAPSULE, DELAYED RELEASE ORAL at 12:42

## 2023-12-27 RX ADMIN — ENOXAPARIN SODIUM 40 MG: 40 INJECTION SUBCUTANEOUS at 08:04

## 2023-12-27 RX ADMIN — ASPIRIN 81 MG: 81 TABLET, COATED ORAL at 08:04

## 2023-12-27 RX ADMIN — AMLODIPINE BESYLATE 2.5 MG: 5 TABLET ORAL at 12:43

## 2023-12-27 SDOH — SOCIAL STABILITY: SOCIAL INSECURITY: ARE THERE ANY APPARENT SIGNS OF INJURIES/BEHAVIORS THAT COULD BE RELATED TO ABUSE/NEGLECT?: NO

## 2023-12-27 SDOH — SOCIAL STABILITY: SOCIAL INSECURITY: HAVE YOU HAD THOUGHTS OF HARMING ANYONE ELSE?: NO

## 2023-12-27 SDOH — SOCIAL STABILITY: SOCIAL INSECURITY: DO YOU FEEL ANYONE HAS EXPLOITED OR TAKEN ADVANTAGE OF YOU FINANCIALLY OR OF YOUR PERSONAL PROPERTY?: NO

## 2023-12-27 SDOH — SOCIAL STABILITY: SOCIAL INSECURITY: ARE YOU OR HAVE YOU BEEN THREATENED OR ABUSED PHYSICALLY, EMOTIONALLY, OR SEXUALLY BY ANYONE?: NO

## 2023-12-27 SDOH — SOCIAL STABILITY: SOCIAL INSECURITY: WERE YOU ABLE TO COMPLETE ALL THE BEHAVIORAL HEALTH SCREENINGS?: YES

## 2023-12-27 SDOH — SOCIAL STABILITY: SOCIAL INSECURITY: ABUSE: ADULT

## 2023-12-27 SDOH — SOCIAL STABILITY: SOCIAL INSECURITY: DO YOU FEEL UNSAFE GOING BACK TO THE PLACE WHERE YOU ARE LIVING?: NO

## 2023-12-27 SDOH — SOCIAL STABILITY: SOCIAL INSECURITY: DOES ANYONE TRY TO KEEP YOU FROM HAVING/CONTACTING OTHER FRIENDS OR DOING THINGS OUTSIDE YOUR HOME?: NO

## 2023-12-27 SDOH — SOCIAL STABILITY: SOCIAL INSECURITY: HAS ANYONE EVER THREATENED TO HURT YOUR FAMILY OR YOUR PETS?: NO

## 2023-12-27 ASSESSMENT — LIFESTYLE VARIABLES
HAVE PEOPLE ANNOYED YOU BY CRITICIZING YOUR DRINKING: NO
PRESCIPTION_ABUSE_PAST_12_MONTHS: NO
HOW OFTEN DO YOU HAVE 6 OR MORE DRINKS ON ONE OCCASION: NEVER
EVER FELT BAD OR GUILTY ABOUT YOUR DRINKING: NO
SKIP TO QUESTIONS 9-10: 1
SUBSTANCE_ABUSE_PAST_12_MONTHS: NO
AUDIT-C TOTAL SCORE: 0
AUDIT-C TOTAL SCORE: 0
HOW MANY STANDARD DRINKS CONTAINING ALCOHOL DO YOU HAVE ON A TYPICAL DAY: PATIENT DOES NOT DRINK
EVER HAD A DRINK FIRST THING IN THE MORNING TO STEADY YOUR NERVES TO GET RID OF A HANGOVER: NO
HAVE YOU EVER FELT YOU SHOULD CUT DOWN ON YOUR DRINKING: NO
REASON UNABLE TO ASSESS: NO
HOW OFTEN DO YOU HAVE A DRINK CONTAINING ALCOHOL: NEVER

## 2023-12-27 ASSESSMENT — PATIENT HEALTH QUESTIONNAIRE - PHQ9
SUM OF ALL RESPONSES TO PHQ9 QUESTIONS 1 & 2: 0
1. LITTLE INTEREST OR PLEASURE IN DOING THINGS: NOT AT ALL
2. FEELING DOWN, DEPRESSED OR HOPELESS: NOT AT ALL

## 2023-12-27 ASSESSMENT — COGNITIVE AND FUNCTIONAL STATUS - GENERAL
WALKING IN HOSPITAL ROOM: A LITTLE
CLIMB 3 TO 5 STEPS WITH RAILING: A LITTLE
MOBILITY SCORE: 22
DAILY ACTIVITIY SCORE: 24
PATIENT BASELINE BEDBOUND: NO

## 2023-12-27 ASSESSMENT — PAIN SCALES - GENERAL
PAINLEVEL_OUTOF10: 0 - NO PAIN
PAINLEVEL_OUTOF10: 0 - NO PAIN

## 2023-12-27 ASSESSMENT — ACTIVITIES OF DAILY LIVING (ADL)
BATHING: INDEPENDENT
HEARING - RIGHT EAR: FUNCTIONAL
PATIENT'S MEMORY ADEQUATE TO SAFELY COMPLETE DAILY ACTIVITIES?: YES
JUDGMENT_ADEQUATE_SAFELY_COMPLETE_DAILY_ACTIVITIES: YES
ADEQUATE_TO_COMPLETE_ADL: YES
LACK_OF_TRANSPORTATION: NO
WALKS IN HOME: INDEPENDENT
TOILETING: INDEPENDENT
DRESSING YOURSELF: INDEPENDENT
GROOMING: INDEPENDENT
FEEDING YOURSELF: INDEPENDENT
HEARING - LEFT EAR: FUNCTIONAL

## 2023-12-27 ASSESSMENT — HEART SCORE
HEART SCORE: 7
TROPONIN: LESS THAN OR EQUAL TO NORMAL LIMIT
ECG: NON-SPECIFIC REPOLARIZATION DISTURBANCE
RISK FACTORS: >2 RISK FACTORS OR HX OF ATHEROSCLEROTIC DISEASE
AGE: 65+
HISTORY: HIGHLY SUSPICIOUS

## 2023-12-27 ASSESSMENT — COLUMBIA-SUICIDE SEVERITY RATING SCALE - C-SSRS
6. HAVE YOU EVER DONE ANYTHING, STARTED TO DO ANYTHING, OR PREPARED TO DO ANYTHING TO END YOUR LIFE?: NO
1. IN THE PAST MONTH, HAVE YOU WISHED YOU WERE DEAD OR WISHED YOU COULD GO TO SLEEP AND NOT WAKE UP?: NO
2. HAVE YOU ACTUALLY HAD ANY THOUGHTS OF KILLING YOURSELF?: NO

## 2023-12-27 ASSESSMENT — PAIN DESCRIPTION - PROGRESSION: CLINICAL_PROGRESSION: NOT CHANGED

## 2023-12-27 ASSESSMENT — PAIN - FUNCTIONAL ASSESSMENT: PAIN_FUNCTIONAL_ASSESSMENT: 0-10

## 2023-12-27 NOTE — DISCHARGE SUMMARY
Discharge Diagnosis  Chest pain  CAD    Discharge Meds     Your medication list        CONTINUE taking these medications        Instructions Last Dose Given Next Dose Due   amLODIPine 2.5 mg tablet  Commonly known as: Norvasc           aspirin 81 mg EC tablet           AZELASTINE-FLUTICASONE NASL           coenzyme Q-10 300 mg capsule capsule           DULoxetine 30 mg DR capsule  Commonly known as: Cymbalta      Take 1 capsule (30 mg) by mouth once daily.       Essential Man 50 Plus 0.4-2-250 mg-mg-mcg tablet  Generic drug: multivit-min-FA-lycopen-lutein           ezetimibe 10 mg tablet  Commonly known as: Zetia           Flovent Diskus 250 mcg/actuation diskus inhaler  Generic drug: fluticasone           naproxen 500 mg tablet  Commonly known as: Naprosyn           pantoprazole 40 mg EC tablet  Commonly known as: ProtoNix      Take 1 tablet (40 mg) by mouth once daily.       Praluent Pen 75 mg/mL pen injector  Generic drug: alirocumab           Praluent Pen 75 mg/mL pen injector  Generic drug: alirocumab      INJECT 75MG (1 PEN) UNDER THE SKIN ONCE EVERY 2 WEEKS.       Praluent Pen 75 mg/mL pen injector  Generic drug: alirocumab      INJECT 75MG (1 PEN) UNDER THE SKIN ONCE EVERY 2 WEEKS.       Praluent Pen 75 mg/mL pen injector  Generic drug: alirocumab      INJECT 75MG (1 PEN) UNDER THE SKIN ONCE EVERY 2 WEEKS.       pravastatin 80 mg tablet  Commonly known as: Pravachol      TAKE 1 TABLET BY MOUTH EVERYDAY AT BEDTIME       tamsulosin 0.4 mg 24 hr capsule  Commonly known as: Flomax      Take 1 capsule (0.4 mg) by mouth once daily.                  Hospital Course  73 year old male with CAD, HTN, HLD, GERD, SHAHNAZ on BiPAP who presented with substernal chest pain. Pain worse with exertion and deep inspiration, improved with aspirin. Troponin negative x3, no ischemia on EKG. Echo with normal EF. Cardiology consulted, etiology unclear, possibly MSK or GI related as he recently has transitioned off PPI to H2 blocker.  Patient instructed to continue baby aspirin and follow up with Dr. Rico for possible further noninvasive testing. He already will be following up with his GI physician next month.    Pertinent Physical Exam At Time of Discharge  General:  Pleasant and cooperative. No apparent distress.  HEENT:  Normocephalic, atraumatic, moist mucous membranes  Chest:  Clear to auscultation bilaterally. Non-labored breathing. No chest wall tenderness.  CV:  Regular rate and rhythm.    Abdomen: Abdomen is soft, non-tender, non-distended. No guarding or rigidity.  Extremities:  No lower extremity edema or cyanosis.  Neurological: AAOx3. Moves all extremities.  Skin:  Warm and dry.  Psych: Appropriate affect      Outpatient Follow-Up  Future Appointments   Date Time Provider Department Center   3/25/2024 10:15 AM Robb Rico DO [id number] [place]         Natividad Bales DO

## 2023-12-27 NOTE — PROGRESS NOTES
Physical Therapy    Discharge Summary    Name: Lucho Alexander  MRN: [id number]  : 1950  Date: 23    Discharge Summary: PT    Discharge Reason: PT Screen: Spoke with pts RN regarding pts current status, pts RN states pt has been independently ambulating to/from the bathroom. Spoke with pt regarding his PLOF and pt states he is independent with all ADLs and mobility without an AD. Pt is currently at his baseline and does not required skilled PT at this time. Pt will be discharged from caseload.

## 2023-12-27 NOTE — CONSULTS
Consults  History Of Present Illness:    Lucho Alexander is a 73 y.o. male presenting with CP.    Patient is a 73-year-old male patient of Dr. Rico with a history of CAD, hypertension, dyslipidemia, bradycardia, reflux disease, anxiety, depression SHAHNAZ on BiPAP who presents with chest discomfort.  Patient states this began on Friday night and awoke him from sleep.  He describes it as a mid epigastric to substernal discomfort with radiation to the shoulders.  He downplayed his symptoms and they slowly improved until Nadeem day, when it resolved.  The next day, he had more symptoms and presented for further evaluation.  Of note, he has been transitioning off pantoprazole to Pepcid.  He states that this transition began a few days prior to his symptoms.  He denies any significant shortness of breath with this discomfort.  He states deep inspiration made his discomfort worse.  He did have some worsening of his symptoms with exertion.  Aspirin made his discomfort better.  He otherwise denies any palpitations, lightheadedness, syncope, orthopnea, PND, lower extremity edema.    Twelve-lead ECG demonstrates sinus rhythm, left axis deviation.  Cardiac enzymes are negative x 3.  D-dimer is negative.  Echocardiogram with an ejection fraction 55-60% with impaired relaxation.  Catheterization in 2007 reviewed by me demonstrates mild to moderate diffuse coronary artery disease.    Last Recorded Vitals:  Vitals:    12/27/23 1000 12/27/23 1245 12/27/23 1400 12/27/23 1522   BP: 156/70 168/81 160/71 160/72   Pulse: 57 62 52 60   Resp: 19 (!) 33 13    Temp:    36.2 °C (97.2 °F)   SpO2: 97% 98% 96% 96%   Weight:       Height:           Last Labs:  CBC - 12/27/2023:  4:47 AM  7.6 15.2 186    44.3      CMP - 12/27/2023:  4:47 AM  9.2 7.0 25 --- 0.8   _ 4.3 21 81      PTT - No results in last year.  _   _ _     Troponin I, High Sensitivity   Date/Time Value Ref Range Status   12/27/2023 04:47 AM 6 0 - 20 ng/L Final   12/26/2023  11:20 PM 5 0 - 20 ng/L Final   12/26/2023 07:56 PM 5 0 - 20 ng/L Final     BNP   Date/Time Value Ref Range Status   12/26/2023 07:56 PM 49 0 - 99 pg/mL Final     TR HGBA1C (Data Conversion)   Date/Time Value Ref Range Status   12/02/2021 11:54 AM 4.5 4.2 - 6.5 % Final     Hemoglobin A1C   Date/Time Value Ref Range Status   12/26/2023 07:56 PM 5.7 (H) see below % Final   10/05/2023 10:02 AM 5.9 (H) see below % Final     LDL Calculated   Date/Time Value Ref Range Status   12/26/2023 11:20 PM 35 <=99 mg/dL Final     Comment:                                 Near   Borderline      AGE      Desirable  Optimal    High     High     Very High     0-19 Y     0 - 109     ---    110-129   >/= 130     ----    20-24 Y     0 - 119     ---    120-159   >/= 160     ----      >24 Y     0 -  99   100-129  130-159   160-189     >/=190       VLDL   Date/Time Value Ref Range Status   12/26/2023 11:20 PM 18 0 - 40 mg/dL Final   09/29/2023 10:18 AM 20 0 - 40 mg/dL Final   10/07/2022 12:20 PM 27 0 - 40 mg/dL Final   05/29/2020 08:57 AM 22 0 - 40 mg/dL Final      Last I/O:  No intake/output data recorded.    Past Cardiology Tests (Last 3 Years):  EKG:  ECG 12 lead 12/27/2023 (Preliminary)    Echo:  Transthoracic Echo (TTE) Complete 12/27/2023  CONCLUSIONS:   1. Left ventricular systolic function is normal with a 55-60% estimated ejection fraction.   2. Spectral Doppler shows an impaired relaxation pattern of left ventricular diastolic filling.  Ejection Fractions:  EF   Date/Time Value Ref Range Status   12/27/2023 09:43 AM 58       Cath:  No results found for this or any previous visit from the past 1095 days.    Stress Test:  Nuclear Stress Test 10/07/2022    Cardiac Imaging:  No results found for this or any previous visit from the past 1095 days.      Past Medical History:  He has a past medical history of Encounter for therapeutic drug level monitoring, Laceration without foreign body, right lower leg, subsequent encounter (09/09/2020),  Other chronic diseases of tonsils and adenoids (03/03/2020), Overweight (12/02/2021), Overweight (09/26/2022), Personal history of other diseases of the musculoskeletal system and connective tissue, Personal history of other diseases of the musculoskeletal system and connective tissue (10/21/2022), Personal history of other specified conditions, Personal history of other specified conditions (09/26/2022), Strain of muscle and tendon of back wall of thorax, initial encounter (11/03/2022), Unspecified hearing loss, unspecified ear (09/19/2019), and Unspecified injury of head, sequela (09/09/2020).    Past Surgical History:  He has a past surgical history that includes Other surgical history (03/28/2022); Other surgical history (03/29/2022); and Other surgical history (03/29/2022).      Social History:  He reports that he has never smoked. He has never used smokeless tobacco. He reports that he does not drink alcohol and does not use drugs.    Family History:  No family history on file.     Allergies:  Atorvastatin    Inpatient Medications:  Scheduled medications   Medication Dose Route Frequency    amLODIPine  2.5 mg oral Daily    aspirin  81 mg oral Daily    DULoxetine  30 mg oral Daily    enoxaparin  40 mg subcutaneous q24h    ezetimibe  10 mg oral Nightly    pantoprazole  40 mg oral Daily    perflutren lipid microspheres  0.5-10 mL of dilution intravenous Once in imaging    pravastatin  80 mg oral Nightly    tamsulosin  0.4 mg oral Daily     PRN medications   Medication    acetaminophen    Or    acetaminophen    Or    acetaminophen    alum-mag hydroxide-simeth    melatonin    nitroglycerin    oxygen    polyethylene glycol     Continuous Medications   Medication Dose Last Rate     Outpatient Medications:  Current Outpatient Medications   Medication Instructions    alirocumab (Praluent Pen) 75 mg/mL pen injector subcutaneous, Every 14 days    alirocumab 75 mg/mL pen injector INJECT 75MG (1 PEN) UNDER THE SKIN ONCE  EVERY 2 WEEKS.    alirocumab 75 mg/mL pen injector INJECT 75MG (1 PEN) UNDER THE SKIN ONCE EVERY 2 WEEKS.    alirocumab 75 mg/mL pen injector INJECT 75MG (1 PEN) UNDER THE SKIN ONCE EVERY 2 WEEKS.    amLODIPine (Norvasc) 2.5 mg tablet 1 tablet, oral, Daily    aspirin 81 mg EC tablet oral    AZELASTINE-FLUTICASONE NASL Intranasal, BID, Refill(s) 0, Date: 07/09/2020 14:39:00 EDT    coenzyme Q-10 300 mg capsule capsule oral, Daily    DULoxetine (CYMBALTA) 30 mg, oral, Daily    ezetimibe (Zetia) 10 mg tablet 1 tablet, oral, Nightly    fluticasone (Flovent Diskus) 250 mcg/actuation diskus inhaler inhalation    multivit-min-FA-lycopen-lutein (Essential Man 50 Plus) 0.4-2-250 mg-mg-mcg tablet 1 tablet, oral, Daily    naproxen (Naprosyn) 500 mg tablet 1 tablet, oral, 2 times daily    pantoprazole (PROTONIX) 40 mg, oral, Daily    pravastatin (PRAVACHOL) 80 mg, oral, Nightly    tamsulosin (FLOMAX) 0.4 mg, oral, Daily       Physical Exam:  Physical Exam  Vitals reviewed.           Assessment/Plan   1.  Chest pain: Unclear etiology.  Given waking up in the middle the night with chest discomfort that persisted for days, followed by resolution with typical and atypical symptoms, suspect a possible GI etiology.  This is more likely given that the patient was recently transition from PPI to H2 blocker.  EKG is benign.  Cardiac enzymes are negative.  Echocardiogram demonstrates normal LV function.  Chest discomfort is currently resolved.    Patient is otherwise stable for discharge from a cardiac standpoint.  Patient should stay on aspirin therapy.  Patient can follow-up with Dr. Rico as an outpatient for repeat evaluation and possible further noninvasive testing.    Peripheral IV 12/26/23 20 G Right Antecubital (Active)   Site Assessment Dry;Clean;Intact 12/27/23 1548   Dressing Status Clean;Dry 12/27/23 1548   Number of days: 1       Code Status:  Full Code      Estevan Harkins MD

## 2023-12-27 NOTE — PROGRESS NOTES
Occupational Therapy                 Therapy Communication Note    Patient Name: Lucho Alexander  MRN: [id number]  Today's Date: 12/27/2023     Discipline: Occupational Therapy    Missed Visit Reason: Missed Visit Reason:  (Chart reviewed; patient seen in room AC-15 at 10:05 am for OT screen.  Patient at/close to baseline function in ADL's and functional transfers/mobility:  indep.  Plan:  Discharge patient from OT since no OT skilled needs; patient agreeable.)    Missed Time: Attempt

## 2023-12-27 NOTE — CARE PLAN
The patient's goals for the shift include      The clinical goals for the shift include      Over the shift, the patient did not make progress toward the following goals. Barriers to progression include acute illness. Recommendations to address these barriers include communication  .

## 2023-12-27 NOTE — ED PROVIDER NOTES
HPI   Chief Complaint   Patient presents with    Chest Pain     X 2 days       Patient is a 73-year-old male past medical history of hypertension hyperlipidemia presenting to the emergency department for chest pain.  Patient reports it to be substernal pressure-like discomfort.  He denies any nausea vomiting fever chills or any other muscle aches and pains.  He took aspirin earlier however that was baby aspirin dose.  Patient denies any nausea vomiting fever chills or any other muscle aches and pains.                          Frank Coma Scale Score: 15   HEART Score: 7                Patient History   Past Medical History:   Diagnosis Date    Encounter for therapeutic drug level monitoring     Medication monitoring encounter    Laceration without foreign body, right lower leg, subsequent encounter 09/09/2020    Laceration of right lower extremity, subsequent encounter    Other chronic diseases of tonsils and adenoids 03/03/2020    Tonsil stone    Overweight 12/02/2021    Overweight with body mass index (BMI) of 27 to 27.9 in adult    Overweight 09/26/2022    Overweight (BMI 25.0-29.9)    Personal history of other diseases of the musculoskeletal system and connective tissue     History of muscle pain    Personal history of other diseases of the musculoskeletal system and connective tissue 10/21/2022    History of lateral epicondylitis of right elbow    Personal history of other specified conditions     History of diarrhea    Personal history of other specified conditions 09/26/2022    History of bradycardia    Strain of muscle and tendon of back wall of thorax, initial encounter 11/03/2022    Strain of thoracic spine    Unspecified hearing loss, unspecified ear 09/19/2019    Decreased hearing    Unspecified injury of head, sequela 09/09/2020    Closed head injury, sequela     Past Surgical History:   Procedure Laterality Date    OTHER SURGICAL HISTORY  03/28/2022    Cardiac catheterization    OTHER SURGICAL HISTORY   03/29/2022    Colonoscopy    OTHER SURGICAL HISTORY  03/29/2022    Endoscopy     No family history on file.  Social History     Tobacco Use    Smoking status: Never    Smokeless tobacco: Never   Substance Use Topics    Alcohol use: Never    Drug use: Never       Physical Exam   ED Triage Vitals [12/26/23 1511]   Temp Heart Rate Resp BP   36.7 °C (98.1 °F) 66 20 --      SpO2 Temp src Heart Rate Source Patient Position   98 % -- -- --      BP Location FiO2 (%)     -- --       Physical Exam  Vitals reviewed.   Constitutional:       Appearance: Normal appearance.   HENT:      Head: Normocephalic and atraumatic.      Nose: Nose normal.      Mouth/Throat:      Mouth: Mucous membranes are moist.   Eyes:      Extraocular Movements: Extraocular movements intact.      Conjunctiva/sclera: Conjunctivae normal.   Cardiovascular:      Rate and Rhythm: Normal rate and regular rhythm.      Pulses: Normal pulses.      Heart sounds: Normal heart sounds.   Pulmonary:      Effort: Pulmonary effort is normal.      Breath sounds: Normal breath sounds.   Abdominal:      General: Abdomen is flat. Bowel sounds are normal.      Palpations: Abdomen is soft.   Musculoskeletal:         General: Normal range of motion.   Skin:     General: Skin is warm and dry.      Capillary Refill: Capillary refill takes less than 2 seconds.   Neurological:      Mental Status: He is alert and oriented to person, place, and time. Mental status is at baseline.      Cranial Nerves: Cranial nerves 2-12 are intact. No cranial nerve deficit.      Sensory: Sensation is intact.      Motor: Motor function is intact. No weakness.   Psychiatric:         Mood and Affect: Mood normal.         ED Course & MDM   ED Course as of 12/27/23 0116   Wed Dec 27, 2023   0115 73-year-old male history of hypertension hyperlipidemia presents emergency room for chest pain.  On examination vital signs are stable 2+ peripheral pulses abdomen nontender.  Patient on my examination is in no  acute distress.  EKG on my interpretation shows sinus rhythm left axis deviation ventricular 59  QRS 80 QTc 392 no other acute ischemic changes appreciated.  Patient has differential diagnosis and should initially included ACS versus angina.  Differential considered however unlikely is pneumothorax cardiac tamponade Boerhaave's or aortic dissection based on patient's history presentation examination.  Provider in triage did perform workup prior to my evaluation.  Chest x-ray my interpretation showed no acute process official read also shows no acute process.  Troponin negative x 2 mag 2.06 BNP peptide 49 lipase 14 CBC and CMP unremarkable.  Patient's heart score is 7.  His last stress test/angiogram was performed more than a year ago.  Patient will be admitted for cardiac workup. [ZS]      ED Course User Index  [ZS] Brian Hubbard MD         Diagnoses as of 12/27/23 0116   Chest pain, unspecified type   Secondary hypertension     HEART Score: 7     Medical Decision Making      Procedure  Procedures     Brian Hubbard MD  12/27/23 0116

## 2023-12-27 NOTE — NURSING NOTE
1530: patient arrived to 3rd floor. Patient stable, alert & oriented no complaints of pain at this time. VSS, Patient was seen by Cardiology and primary. Orders for DC were placed. Head to toe assessment  & vitals were completed & charted.     DC paper work reviewed with patient - tele off & IV removed. Patient Dcd home with wife.

## 2023-12-27 NOTE — H&P
"[facility] Medicine History & Physical    Patient Name: Lucho Alexander   YOB: 1950    Subjective:  Lucho Alexander is a 73 y.o. male with HTN, HLD, CAD no stents, hx bradycardia, GERD, anxiety, depression, asthma, BPH, SHAHNAZ on BIPAP, peripheral neuropathy, who presents with chest pain. CP started Friday night after patient put up [place] decorations and has been intermittent since. The CP is midsternal/ epigastric with some radiation to both shoulders & back, and appears to be worse with exertion and deep breathes and better with baby asa. Patient describes it as a pressure that is severe at times. He denies any associated diaphoresis, N/V, dizziness, leg edema or sob. He denies any sick contacts, fevers, chills, cough, congestion, diarrhea or dysuria. He has been compliant with BiPAP. States his gastroenterologist is transitioning him off pantoprazole to pepcid over last couple weeks but denies any worsened heartburn and this feels different. Workup in ED unremarkable with 2 negative trops and no significant ECG changes. Lipase & d-dimer also negative.    Patient follows with Dr. Rico from cardiology. His last stress test was in 10/22 and was normal. Per chart review, patient has a hx of moderate 3 vessel CAD per cath in 2007 but no stents.    A 10 point ROS was completed and is negative expect as stated in HPI.     Past Medical History:  As above    Past Surgical History:  Cardiac cath, colonoscopy/ EGD    Social History: Smoking - former, quit in 1985, smoked 1-3 ppd x 20 years, Alcohol ~once a week not daily, Recreational Drugs - denies    Family History: DM, CAD, MI, father passed at age 57    Objective:    Pulse 66   Temp 36.7 °C (98.1 °F)   Resp 20   Ht 1.778 m (5' 10\")   Wt 81.6 kg (180 lb)   SpO2 98%   BMI 25.83 kg/m²     Physical Exam:  GENERAL: Well developed, A&Ox3, no distress, alert and cooperative  HEENT: Normocephalic, atraumatic, EOMI, clear sclera, MMM  CARDIOVASCULAR: " Regular rate and rhythm, no murmurs, rubs, or gallops. S1/S2  RESPIRATORY: CTAB, no wheezes, rales or rhonchi. No distress  ABDOMEN: Soft, ND, non-tender. No rebound or guarding. BS+  EXTREMITIES: No peripheral edema  NEUROLOGICAL: A&O x3. CN II-XII grossly intact. No focal deficits  SKIN: Warm and dry, no rashes  PSYCH: appropriate mood and affect    Assessment/Plan:    Atypical chest pain  Hx CAD no stents    HTN, HLD  Hx bradycardia  GERD  Anxiety, depression  Asthma, mild intermittent   SHAHNAZ on BIPAP  BPH  Peripheral neuropathy    Trop negative x2, no significant ECG changes, CP does not appear cardiac in nature but pt has hx of moderate 3 vessel dz without stents & last stress was in 10/22 (normal). Monitor on tele, obtain repeat echo, consult cards, A1c/ lipid panel, continue statin & baby asa  Continue nightly BiPAP. Med rec is not reconciled, will continue home meds once updated    DVT Prophylaxis: Lovenox, SCDs  Code Status: Full Code, confirmed with patient  Disposition: Tele obs      Kelly Blanton DO  [facility] Medicine

## 2023-12-29 ENCOUNTER — PATIENT OUTREACH (OUTPATIENT)
Dept: CARE COORDINATION | Facility: CLINIC | Age: 73
End: 2023-12-29
Payer: MEDICARE

## 2023-12-29 NOTE — PROGRESS NOTES
Discharge Facility:[facility]  Discharge Diagnosis:Chest Pain, CAD  Admission Date:12/26/2023  Discharge Date: 12/27/2023    PCP Appointment Date:Declined, will fu with GI, Cardio  Specialist Appointment Date: Cardiology/Trisha 1/22/2024, GI TBD  Hospital Encounter and Summary: Linked or not available at this time (pick one)  See discharge assessment below for further details  Engagement  Call Start Time: 0925 (12/29/2023  9:36 AM)    Medications  Medications reviewed with patient/caregiver?: Yes (No Change of medications) (12/29/2023  9:36 AM)  Is the patient having any side effects they believe may be caused by any medication additions or changes?: No (12/29/2023  9:36 AM)  Does the patient have all medications ordered at discharge?: Not applicable (12/29/2023  9:36 AM)  Care Management Interventions: No intervention needed (12/29/2023  9:36 AM)  Is the patient taking all medications as directed (includes completed medication regime)?: Yes (12/29/2023  9:36 AM)    Appointments  Does the patient have a primary care provider?: Yes (12/29/2023  9:36 AM)  Care Management Interventions: -- (Declined PCP follow up) (12/29/2023  9:36 AM)  Has the patient kept scheduled appointments due by today?: Yes (12/29/2023  9:36 AM)  Care Management Interventions: Advised to schedule with specialist (To follow up with Cardio, GI) (12/29/2023  9:36 AM)    Patient Teaching  Does the patient have access to their discharge instructions?: Yes (12/29/2023  9:36 AM)  What is the patient's perception of their health status since discharge?: Improving (Lucho states the chest pain has improved but has come down with viral illness since returning home. He will contact provider if any concerns.) (12/29/2023  9:36 AM)  Is the patient/caregiver able to teach back the hierarchy of who to call/visit for symptoms/problems? PCP, Specialist, Home Health nurse, Urgent Care, ED, 911: Yes (12/29/2023  9:36 AM)    Wrap Up  Wrap Up Additional Comments: --  (Lucho will fu with Cardio and GI. He declines PCP fu at this time. Chest pain has improved but now with cough and aches. He has gotten his vaccines, so hopefully will be mild.) (12/29/2023  9:36 AM)

## 2024-01-04 DIAGNOSIS — I50.22 CHRONIC SYSTOLIC (CONGESTIVE) HEART FAILURE (MULTI): Primary | ICD-10-CM

## 2024-01-10 ENCOUNTER — TELEMEDICINE (OUTPATIENT)
Dept: PHARMACY | Facility: HOSPITAL | Age: 74
End: 2024-01-10
Payer: MEDICARE

## 2024-01-10 DIAGNOSIS — I50.22 CHRONIC SYSTOLIC (CONGESTIVE) HEART FAILURE (MULTI): ICD-10-CM

## 2024-01-10 DIAGNOSIS — E78.5 HYPERLIPIDEMIA, UNSPECIFIED HYPERLIPIDEMIA TYPE: Primary | ICD-10-CM

## 2024-01-10 LAB
ATRIAL RATE: 59 BPM
P AXIS: 2 DEGREES
P OFFSET: 174 MS
P ONSET: 125 MS
PR INTERVAL: 182 MS
Q ONSET: 216 MS
QRS COUNT: 9 BEATS
QRS DURATION: 80 MS
QT INTERVAL: 396 MS
QTC CALCULATION(BAZETT): 392 MS
QTC FREDERICIA: 394 MS
R AXIS: -50 DEGREES
T AXIS: 45 DEGREES
T OFFSET: 414 MS
VENTRICULAR RATE: 59 BPM

## 2024-01-10 RX ORDER — FAMOTIDINE 20 MG/1
20 TABLET, FILM COATED ORAL DAILY
COMMUNITY

## 2024-01-10 RX ORDER — MELATONIN 3 MG
3 CAPSULE ORAL NIGHTLY
COMMUNITY

## 2024-01-10 ASSESSMENT — ENCOUNTER SYMPTOMS
CLAUDICATION: 0
ORTHOPNEA: 0
EDEMA: 0
CHEST PRESSURE: 0
UNEXPECTED WEIGHT CHANGE: 0
PALPITATIONS: 0
SHORTNESS OF BREATH: 1
NEAR-SYNCOPE: 0
ABDOMINAL PAIN: 0
FATIGUE: 1

## 2024-01-10 NOTE — Clinical Note
Hi Dr. Leyva,  I just met with a patient of yours today and he wants help in reducing the cost of his Praluent as its too expensive for him. If acceptable, please make a referral to the clinical pharmacy team for hyperlipidemia and we can help enroll him into  PAP to reduce the cost of his Praluent to $0. If acceptable, I can help make the referral for you.  Thank you, Jeovanny mays, PharmD

## 2024-01-10 NOTE — ASSESSMENT & PLAN NOTE
CONTINUE ALL MEDICATIONS as prescribed. Pt HF not currently manage, will not make changes. Cardiologist to determine.  Msg PCP to get referral for HLD to help with praluent cost  Pt to send in his 1040 2022 tax return form  Once form is received, will send [facility] Eligible script to pt's preferred  Pharmacy to enroll into  PAP to help reduce cost of Praluent to $0 COPAY  FUV in 4 weeks to discuss outcome of  PAP

## 2024-01-10 NOTE — PROGRESS NOTES
Pharmacy Post-Discharge Visit  Lucho Alexander is a 73 y.o. male was referred to Clinical Pharmacy Team to complete a post-discharge medication optimization and monitoring visit.  The patient was referred for their Congestive Heart Failure.    Admission Date: 12/27/2023  Discharge Date: 12/27/2023    Referring Provider: Andrez Leyva DO    Subjective   Allergies   Allergen Reactions    Atorvastatin Other       [facility] [place] [facility] Portal to Registered [facility] Sites  [facility]  [place] [address]  Phone: [phone] Fax: [phone]    [facility]/pharmacy #[id number] - [place], [place] - [address] [facility] AT [address] [address]  Phone: [phone] Fax: [phone]      Social History     Social History Narrative    Not on file        Notable Medication changes following discharge:  Start: none  Stop: none  Change: none    Congestive Heart Failure  Presents for initial visit. Associated symptoms include fatigue and shortness of breath (with exertion (1/10)). Pertinent negatives include no abdominal pain, chest pain, chest pressure, claudication, edema, muscle weakness, near-syncope, nocturia, orthopnea, palpitations, paroxysmal nocturnal dyspnea or unexpected weight change. The symptoms have been stable. Past treatments include nothing. Compliance with prior treatments has been good.     Denies chest pain or pressure. Feeling much better    Review of Systems   Constitutional:  Positive for fatigue. Negative for unexpected weight change.   Respiratory:  Positive for shortness of breath (with exertion (1/10)).    Cardiovascular:  Negative for chest pain, palpitations, claudication and near-syncope.   Gastrointestinal:  Negative for abdominal pain.   Genitourinary:  Negative for nocturia.   Musculoskeletal:  Negative for muscle weakness.       Medication System Management:  Affordability/Accessibility:  Patient has concern with cost of Praluent (about $75 dollar a month)  Adherence/Organization: None  Adverse Effects: None    Objective     There were no vitals taken for this visit.     LAB  Lab Results   Component Value Date    BILITOT 0.8 12/26/2023    CALCIUM 9.2 12/27/2023    CO2 33 (H) 12/27/2023     12/27/2023    CREATININE 0.89 12/27/2023    GLUCOSE 118 (H) 12/27/2023    ALKPHOS 81 12/26/2023    K 3.5 12/27/2023    PROT 7.0 12/26/2023     12/27/2023    AST 25 12/26/2023    ALT 21 12/26/2023    BUN 16 12/27/2023    ANIONGAP 8 (L) 12/27/2023    MG 2.06 12/26/2023    ALBUMIN 4.3 12/26/2023    AMYLASE 59 10/21/2021    LIPASE 14 12/26/2023    GFRMALE 79 02/08/2022     Lab Results   Component Value Date    TRIG 91 12/26/2023    CHOL 117 12/26/2023    LDLCALC 35 12/26/2023    HDL 64.3 12/26/2023     Lab Results   Component Value Date    HGBA1C 5.7 (H) 12/26/2023         Current Outpatient Medications on File Prior to Visit   Medication Sig Dispense Refill    alirocumab 75 mg/mL pen injector INJECT 75MG (1 PEN) UNDER THE SKIN ONCE EVERY 2 WEEKS. 6 mL 3    alirocumab 75 mg/mL pen injector INJECT 75MG (1 PEN) UNDER THE SKIN ONCE EVERY 2 WEEKS. 6 mL 1    alirocumab 75 mg/mL pen injector INJECT 75MG (1 PEN) UNDER THE SKIN ONCE EVERY 2 WEEKS. 6 mL 3    amLODIPine (Norvasc) 2.5 mg tablet Take 1 tablet (2.5 mg) by mouth once daily.      aspirin 81 mg EC tablet Take by mouth.      coenzyme Q-10 300 mg capsule capsule Take by mouth once daily.      DULoxetine (Cymbalta) 30 mg DR capsule Take 1 capsule (30 mg) by mouth once daily. 90 capsule 3    famotidine (Pepcid) 20 mg tablet Take 1 tablet (20 mg) by mouth once daily.      melatonin 3 mg capsule Take 3 mg by mouth once daily at bedtime.      multivit-min-FA-lycopen-lutein (Essential Man 50 Plus) 0.4-2-250 mg-mg-mcg tablet Take 1 tablet by mouth once daily.      pravastatin (Pravachol) 80 mg tablet TAKE 1 TABLET BY MOUTH EVERYDAY AT BEDTIME 90 tablet 0     tamsulosin (Flomax) 0.4 mg 24 hr capsule Take 1 capsule (0.4 mg) by mouth once daily. 90 capsule 3    [DISCONTINUED] pantoprazole (ProtoNix) 40 mg EC tablet Take 1 tablet (40 mg) by mouth once daily. 90 tablet 3    [DISCONTINUED] alirocumab (Praluent Pen) 75 mg/mL pen injector Inject under the skin every 14 (fourteen) days.      [DISCONTINUED] AZELASTINE-FLUTICASONE NASL Intranasal, BID, Refill(s) 0, Date: 07/09/2020 14:39:00 EDT      [DISCONTINUED] ezetimibe (Zetia) 10 mg tablet Take 1 tablet (10 mg) by mouth once daily at bedtime.      [DISCONTINUED] fluticasone (Flovent Diskus) 250 mcg/actuation diskus inhaler Inhale.      [DISCONTINUED] naproxen (Naprosyn) 500 mg tablet Take 1 tablet (500 mg) by mouth in the morning and 1 tablet (500 mg) before bedtime.       No current facility-administered medications on file prior to visit.        DRUG INTERATIONS  - No drug interactions were found at this visit.    Assessment/Plan   Problem List Items Addressed This Visit       Chronic systolic (congestive) heart failure (CMS/HCC)     CONTINUE ALL MEDICATIONS as prescribed. Pt HF not currently manage, will not make changes. Cardiologist to determine.  Msg PCP to get referral for HLD to help with praluent cost  Pt to send in his 1040 2022 tax return form  Once form is received, will send [facility] Eligible script to pt's preferred  Pharmacy to enroll into  PAP to help reduce cost of Praluent to $0 COPAY  FUV in 4 weeks to discuss outcome of  PAP            Continue all meds under the continuation of care with the referring provider and clinical pharmacy team.    Jeovanny Sibley PharmD     Verbal consent to manage patient's drug therapy was obtained from [the patient and/or an individual authorized to act on behalf of a patient]. They were informed they may decline to participate or withdraw from participation in pharmacy services at any time.

## 2024-01-15 PROBLEM — M19.90 ARTHRITIS: Status: ACTIVE | Noted: 2024-01-15

## 2024-01-15 PROBLEM — R26.2 DISABILITY OF WALKING: Status: ACTIVE | Noted: 2024-01-15

## 2024-01-15 PROBLEM — I25.10 ARTERIOSCLEROSIS OF CORONARY ARTERY: Status: ACTIVE | Noted: 2022-10-07

## 2024-01-15 PROBLEM — H25.13 NUCLEAR SCLEROSIS OF BOTH EYES: Status: ACTIVE | Noted: 2018-04-24

## 2024-01-15 PROBLEM — I15.9 BENIGN SECONDARY HYPERTENSION: Status: ACTIVE | Noted: 2023-07-28

## 2024-01-15 PROBLEM — S06.9XAA TBI (TRAUMATIC BRAIN INJURY) (MULTI): Status: ACTIVE | Noted: 2023-06-26

## 2024-01-15 PROBLEM — M54.40 LOW BACK PAIN WITH SCIATICA: Status: ACTIVE | Noted: 2018-06-14

## 2024-01-22 ENCOUNTER — OFFICE VISIT (OUTPATIENT)
Dept: CARDIOLOGY | Facility: CLINIC | Age: 74
End: 2024-01-22
Payer: MEDICARE

## 2024-01-22 VITALS
HEART RATE: 51 BPM | WEIGHT: 193.2 LBS | DIASTOLIC BLOOD PRESSURE: 62 MMHG | BODY MASS INDEX: 27.72 KG/M2 | OXYGEN SATURATION: 99 % | SYSTOLIC BLOOD PRESSURE: 132 MMHG

## 2024-01-22 DIAGNOSIS — I15.9 BENIGN SECONDARY HYPERTENSION: ICD-10-CM

## 2024-01-22 DIAGNOSIS — G47.30 SLEEP APNEA, UNSPECIFIED TYPE: ICD-10-CM

## 2024-01-22 DIAGNOSIS — I25.10 ARTERIOSCLEROSIS OF CORONARY ARTERY: Primary | ICD-10-CM

## 2024-01-22 DIAGNOSIS — R07.9 CHEST PAIN, UNSPECIFIED TYPE: ICD-10-CM

## 2024-01-22 DIAGNOSIS — Z09 HOSPITAL DISCHARGE FOLLOW-UP: ICD-10-CM

## 2024-01-22 DIAGNOSIS — R00.1 BRADYCARDIA: ICD-10-CM

## 2024-01-22 PROCEDURE — 1036F TOBACCO NON-USER: CPT | Performed by: INTERNAL MEDICINE

## 2024-01-22 PROCEDURE — 3078F DIAST BP <80 MM HG: CPT | Performed by: INTERNAL MEDICINE

## 2024-01-22 PROCEDURE — 3075F SYST BP GE 130 - 139MM HG: CPT | Performed by: INTERNAL MEDICINE

## 2024-01-22 PROCEDURE — 1159F MED LIST DOCD IN RCRD: CPT | Performed by: INTERNAL MEDICINE

## 2024-01-22 PROCEDURE — 1125F AMNT PAIN NOTED PAIN PRSNT: CPT | Performed by: INTERNAL MEDICINE

## 2024-01-22 PROCEDURE — 1158F ADVNC CARE PLAN TLK DOCD: CPT | Performed by: INTERNAL MEDICINE

## 2024-01-22 PROCEDURE — 99215 OFFICE O/P EST HI 40 MIN: CPT | Performed by: INTERNAL MEDICINE

## 2024-01-22 PROCEDURE — 1160F RVW MEDS BY RX/DR IN RCRD: CPT | Performed by: INTERNAL MEDICINE

## 2024-01-22 NOTE — PROGRESS NOTES
Chief Complaint:   Hospital Follow-up (Patient states they are here for a hospital follow up.)     History Of Present Illness:    Lucho Alexander is a 73 y.o. male presenting with cv dz.  Admitted 12/2023 with chest pain-R/O for MI  Still with some intermittent chest discomfort with stress/anxiety  Some palpitations when anxious  Some SOB  No syncope  No regular exercise but active around home and walks dog     Last Recorded Vitals:  Vitals:    01/22/24 0854 01/22/24 0930   BP: 142/74 132/62   BP Location: Left arm    Patient Position: Sitting    BP Cuff Size: Adult    Pulse: 51    SpO2: 99%    Weight: 87.6 kg (193 lb 3.2 oz)             Allergies:  Atorvastatin    Outpatient Medications:  Current Outpatient Medications   Medication Instructions    alirocumab 75 mg/mL pen injector INJECT 75MG (1 PEN) UNDER THE SKIN ONCE EVERY 2 WEEKS.    amLODIPine (Norvasc) 2.5 mg tablet 1 tablet, oral, Daily    aspirin 81 mg EC tablet oral    coenzyme Q-10 300 mg capsule capsule oral, Daily    DULoxetine (CYMBALTA) 30 mg, oral, Daily    famotidine (PEPCID) 20 mg, oral, Daily    melatonin 3 mg, oral, Nightly    multivit-min-FA-lycopen-lutein (Essential Man 50 Plus) 0.4-2-250 mg-mg-mcg tablet 1 tablet, oral, Daily    pravastatin (PRAVACHOL) 80 mg, oral, Nightly    tamsulosin (FLOMAX) 0.4 mg, oral, Daily       Physical Exam:  Constitutional:       Appearance: Healthy appearance. Not in distress.   Neck:      Vascular: No JVR. JVD normal.   Pulmonary:      Effort: Pulmonary effort is normal.      Breath sounds: Normal breath sounds. No wheezing. No rhonchi. No rales.   Chest:      Chest wall: Not tender to palpatation.   Cardiovascular:      PMI at left midclavicular line. Normal rate. Regular rhythm. Normal S1. Normal S2.       Murmurs: There is no murmur.      No gallop.  No click. No rub.   Pulses:     Intact distal pulses.   Edema:     Peripheral edema absent.   Abdominal:      General: Bowel sounds are normal.      Palpations:  Abdomen is soft.      Tenderness: There is no abdominal tenderness.   Musculoskeletal: Normal range of motion.         General: No tenderness. Skin:     General: Skin is warm and dry.   Neurological:      General: No focal deficit present.      Mental Status: Alert and oriented to person, place and time.          Last Labs:  CBC -  Lab Results   Component Value Date    WBC 7.6 12/27/2023    HGB 15.2 12/27/2023    HCT 44.3 12/27/2023    MCV 92 12/27/2023     12/27/2023       CMP -  Lab Results   Component Value Date    CALCIUM 9.2 12/27/2023    PROT 7.0 12/26/2023    ALBUMIN 4.3 12/26/2023    AST 25 12/26/2023    ALT 21 12/26/2023    ALKPHOS 81 12/26/2023    BILITOT 0.8 12/26/2023       LIPID PANEL -   Lab Results   Component Value Date    CHOL 117 12/26/2023    TRIG 91 12/26/2023    HDL 64.3 12/26/2023    CHHDL 1.8 12/26/2023    LDLF 50 09/29/2023    VLDL 18 12/26/2023    NHDL 53 12/26/2023       RENAL FUNCTION PANEL -   Lab Results   Component Value Date    GLUCOSE 118 (H) 12/27/2023     12/27/2023    K 3.5 12/27/2023     12/27/2023    CO2 33 (H) 12/27/2023    ANIONGAP 8 (L) 12/27/2023    BUN 16 12/27/2023    CREATININE 0.89 12/27/2023    GFRMALE 79 02/08/2022    CALCIUM 9.2 12/27/2023    ALBUMIN 4.3 12/26/2023        Lab Results   Component Value Date    BNP 49 12/26/2023    HGBA1C 5.7 (H) 12/26/2023    HGBA1C 4.5 12/02/2021           Lab review: I have personally reviewed the laboratory result(s)       Problem List Items Addressed This Visit       Arteriosclerosis of coronary artery - Primary    Overview     Per 2007 cath - moderate 3 vessel dz  Had CP admission 12/2023-R/O for MI but still with intermittent discomfort  10/2022 stress test with good functional status with NL perfusion / NL LVEF-recheck stress test  Continue ASA / statin  No beta blocker with prior bradycardia         Benign secondary hypertension    Overview     BP controlled          Bradycardia    Overview     Comment on  above: No beta blockers         Sleep apnea    Overview     On Bi Pap         Chest pain    Overview     Check stress test         Hospital discharge follow-up    Overview     Records reviewed            Stress test=CP    Robb Rico DO

## 2024-01-25 ENCOUNTER — HOSPITAL ENCOUNTER (OUTPATIENT)
Dept: RADIOLOGY | Facility: CLINIC | Age: 74
Discharge: HOME | End: 2024-01-25
Payer: MEDICARE

## 2024-01-25 ENCOUNTER — OFFICE VISIT (OUTPATIENT)
Dept: CARDIOLOGY | Facility: CLINIC | Age: 74
End: 2024-01-25
Payer: MEDICARE

## 2024-01-25 ENCOUNTER — HOSPITAL ENCOUNTER (OUTPATIENT)
Dept: CARDIOLOGY | Facility: CLINIC | Age: 74
Discharge: HOME | End: 2024-01-25
Payer: MEDICARE

## 2024-01-25 VITALS
HEART RATE: 75 BPM | BODY MASS INDEX: 26.48 KG/M2 | DIASTOLIC BLOOD PRESSURE: 58 MMHG | WEIGHT: 185 LBS | SYSTOLIC BLOOD PRESSURE: 112 MMHG | HEIGHT: 70 IN | OXYGEN SATURATION: 97 %

## 2024-01-25 DIAGNOSIS — R13.10 DYSPHAGIA: Primary | ICD-10-CM

## 2024-01-25 DIAGNOSIS — I25.10 ARTERIOSCLEROSIS OF CORONARY ARTERY: ICD-10-CM

## 2024-01-25 DIAGNOSIS — R07.9 CHEST PAIN, UNSPECIFIED TYPE: ICD-10-CM

## 2024-01-25 DIAGNOSIS — I15.9 BENIGN SECONDARY HYPERTENSION: ICD-10-CM

## 2024-01-25 DIAGNOSIS — R06.02 SHORTNESS OF BREATH: ICD-10-CM

## 2024-01-25 DIAGNOSIS — Z09 HOSPITAL DISCHARGE FOLLOW-UP: ICD-10-CM

## 2024-01-25 DIAGNOSIS — E78.5 HYPERLIPIDEMIA, UNSPECIFIED HYPERLIPIDEMIA TYPE: ICD-10-CM

## 2024-01-25 DIAGNOSIS — I25.10 ARTERIOSCLEROSIS OF CORONARY ARTERY: Primary | ICD-10-CM

## 2024-01-25 DIAGNOSIS — G47.30 SLEEP APNEA, UNSPECIFIED TYPE: ICD-10-CM

## 2024-01-25 PROCEDURE — 93017 CV STRESS TEST TRACING ONLY: CPT

## 2024-01-25 PROCEDURE — 3078F DIAST BP <80 MM HG: CPT | Performed by: INTERNAL MEDICINE

## 2024-01-25 PROCEDURE — 1125F AMNT PAIN NOTED PAIN PRSNT: CPT | Performed by: INTERNAL MEDICINE

## 2024-01-25 PROCEDURE — 78452 HT MUSCLE IMAGE SPECT MULT: CPT | Performed by: INTERNAL MEDICINE

## 2024-01-25 PROCEDURE — 1036F TOBACCO NON-USER: CPT | Performed by: INTERNAL MEDICINE

## 2024-01-25 PROCEDURE — 93016 CV STRESS TEST SUPVJ ONLY: CPT | Performed by: INTERNAL MEDICINE

## 2024-01-25 PROCEDURE — 1160F RVW MEDS BY RX/DR IN RCRD: CPT | Performed by: INTERNAL MEDICINE

## 2024-01-25 PROCEDURE — 1159F MED LIST DOCD IN RCRD: CPT | Performed by: INTERNAL MEDICINE

## 2024-01-25 PROCEDURE — 78452 HT MUSCLE IMAGE SPECT MULT: CPT

## 2024-01-25 PROCEDURE — 99212 OFFICE O/P EST SF 10 MIN: CPT | Performed by: INTERNAL MEDICINE

## 2024-01-25 PROCEDURE — 3074F SYST BP LT 130 MM HG: CPT | Performed by: INTERNAL MEDICINE

## 2024-01-25 NOTE — PROGRESS NOTES
"Chief Complaint:   Follow-up (Stress test results)     History Of Present Illness:    Lucho Alexander is a 73 y.o. male presenting for stress test follow up.  Admitted 12/2023 with chest pain-R/O for MI  Still had  intermittent chest discomfort with stress/anxiety after ED visit thus ordered stress test.  No CP since last OV-\"feels well\"  Some palpitations when anxious  Some SOB  No syncope  No regular exercise but active around home and walks dog     Last Recorded Vitals:  Vitals:    01/25/24 1337 01/25/24 1435   BP: 130/70 112/58   BP Location: Left arm    Patient Position: Sitting    BP Cuff Size: Adult    Pulse: 75    SpO2: 97%    Weight: 83.9 kg (185 lb)    Height: 1.778 m (5' 10\")             Allergies:  Atorvastatin    Outpatient Medications:  Current Outpatient Medications   Medication Instructions    alirocumab 75 mg/mL pen injector INJECT 75MG (1 PEN) UNDER THE SKIN ONCE EVERY 2 WEEKS.    amLODIPine (Norvasc) 2.5 mg tablet 1 tablet, oral, Daily    aspirin 81 mg EC tablet oral    coenzyme Q-10 300 mg capsule capsule oral, Daily    DULoxetine (CYMBALTA) 30 mg, oral, Daily    famotidine (PEPCID) 20 mg, oral, Daily    melatonin 3 mg, oral, Nightly    multivit-min-FA-lycopen-lutein (Essential Man 50 Plus) 0.4-2-250 mg-mg-mcg tablet 1 tablet, oral, Daily    pravastatin (PRAVACHOL) 80 mg, oral, Nightly    tamsulosin (FLOMAX) 0.4 mg, oral, Daily       Physical Exam:  Constitutional:       Appearance: Healthy appearance. Not in distress.   Neck:      Vascular: No JVR. JVD normal.   Pulmonary:      Effort: Pulmonary effort is normal.      Breath sounds: Normal breath sounds. No wheezing. No rhonchi. No rales.   Chest:      Chest wall: Not tender to palpatation.   Cardiovascular:      PMI at left midclavicular line. Normal rate. Regular rhythm. Normal S1. Normal S2.       Murmurs: There is no murmur.      No gallop.  No click. No rub.   Pulses:     Intact distal pulses.   Edema:     Peripheral edema absent. "   Abdominal:      General: Bowel sounds are normal.      Palpations: Abdomen is soft.      Tenderness: There is no abdominal tenderness.   Musculoskeletal: Normal range of motion.         General: No tenderness. Skin:     General: Skin is warm and dry.   Neurological:      General: No focal deficit present.      Mental Status: Alert and oriented to person, place and time.          Last Labs:  CBC -  Lab Results   Component Value Date    WBC 7.6 12/27/2023    HGB 15.2 12/27/2023    HCT 44.3 12/27/2023    MCV 92 12/27/2023     12/27/2023       CMP -  Lab Results   Component Value Date    CALCIUM 9.2 12/27/2023    PROT 7.0 12/26/2023    ALBUMIN 4.3 12/26/2023    AST 25 12/26/2023    ALT 21 12/26/2023    ALKPHOS 81 12/26/2023    BILITOT 0.8 12/26/2023       LIPID PANEL -   Lab Results   Component Value Date    CHOL 117 12/26/2023    TRIG 91 12/26/2023    HDL 64.3 12/26/2023    CHHDL 1.8 12/26/2023    LDLF 50 09/29/2023    VLDL 18 12/26/2023    NHDL 53 12/26/2023       RENAL FUNCTION PANEL -   Lab Results   Component Value Date    GLUCOSE 118 (H) 12/27/2023     12/27/2023    K 3.5 12/27/2023     12/27/2023    CO2 33 (H) 12/27/2023    ANIONGAP 8 (L) 12/27/2023    BUN 16 12/27/2023    CREATININE 0.89 12/27/2023    GFRMALE 79 02/08/2022    CALCIUM 9.2 12/27/2023    ALBUMIN 4.3 12/26/2023        Lab Results   Component Value Date    BNP 49 12/26/2023    HGBA1C 5.7 (H) 12/26/2023    HGBA1C 4.5 12/02/2021           Lab review: I have personally reviewed the laboratory result(s)       Problem List Items Addressed This Visit       Arteriosclerosis of coronary artery - Primary    Overview     Per 2007 cath - moderate 3 vessel dz  Had CP admission 12/2023-R/O for MI.  1/2024 stress test with average exercise tolerance with NL perfusion/NL LVEF    Continue ASA / statin  No beta blocker with prior bradycardia         Benign secondary hypertension    Overview     BP controlled          Hyperlipidemia    Overview      Was on Livalo/Zetia with 9/2019 LDL=71 (had not tolerated high intensity statins). Changed Livalo to pravastatin because of insurance.  5/2020 LDL=86,increased pravastatin  Then on pravastatin / ezetimibe  10/2022 XAP=589  Started PCSK9 inhibitor (Praluent)   9/2023 lipids excellent         Sleep apnea    Overview     On Bi Pap         Chest pain    Overview     Resolved            Robb Rico, DO

## 2024-01-31 ENCOUNTER — APPOINTMENT (OUTPATIENT)
Dept: PHARMACY | Facility: HOSPITAL | Age: 74
End: 2024-01-31
Payer: MEDICARE

## 2024-02-08 RX ORDER — ALIROCUMAB 75 MG/ML
75 INJECTION, SOLUTION SUBCUTANEOUS
Qty: 2 ML | Refills: 11 | Status: SHIPPED | OUTPATIENT
Start: 2024-02-08

## 2024-02-15 PROCEDURE — RXMED WILLOW AMBULATORY MEDICATION CHARGE

## 2024-02-17 ENCOUNTER — PHARMACY VISIT (OUTPATIENT)
Dept: PHARMACY | Facility: CLINIC | Age: 74
End: 2024-02-17
Payer: MEDICARE

## 2024-02-21 DIAGNOSIS — E78.5 HYPERLIPIDEMIA, UNSPECIFIED: ICD-10-CM

## 2024-02-21 RX ORDER — PRAVASTATIN SODIUM 80 MG/1
80 TABLET ORAL NIGHTLY
Qty: 90 TABLET | Refills: 3 | Status: SHIPPED | OUTPATIENT
Start: 2024-02-21

## 2024-02-28 ENCOUNTER — TELEMEDICINE (OUTPATIENT)
Dept: PHARMACY | Facility: HOSPITAL | Age: 74
End: 2024-02-28
Payer: MEDICARE

## 2024-02-28 DIAGNOSIS — E78.5 HYPERLIPIDEMIA, UNSPECIFIED HYPERLIPIDEMIA TYPE: Primary | ICD-10-CM

## 2024-02-28 NOTE — PROGRESS NOTES
[facility] Pharmacy Consult  Lucho Alexander is a 73 y.o. male was referred to Clinical Pharmacy Team for a Pharmacy consult.  The patient was referred for their Medication Cost.    Referring Provider: Andrez Leyva DO    Subjective   Allergies   Allergen Reactions    Atorvastatin Unknown       [facility] - [place] - [facility] AT Portal to Registered [facility] Sites  [facility]  [place] [address]  Phone: [phone] Fax: [phone]    [facility] #[id number] - [place], [place] - [address] [facility] AT [facility] OF [address] [address]  Phone: [phone] Fax: [phone]    [facility]  [address], [address]  [facility] [address]  Phone: [phone] Fax: [phone]      HPI    Patient is here for a follow up with the clinical ambulatory team in regards to cost of his medication. Last visit, pt was signed up with  PAP to help reduce the cost of his Praluent to $0 copay. Pt reported he has received his shipment with no concerns. No other concerns from the patient.    Review of Systems    Objective     There were no vitals taken for this visit.     LAB  Lab Results   Component Value Date    BILITOT 0.8 12/26/2023    CALCIUM 9.2 12/27/2023    CO2 33 (H) 12/27/2023     12/27/2023    CREATININE 0.89 12/27/2023    GLUCOSE 118 (H) 12/27/2023    ALKPHOS 81 12/26/2023    K 3.5 12/27/2023    PROT 7.0 12/26/2023     12/27/2023    AST 25 12/26/2023    ALT 21 12/26/2023    BUN 16 12/27/2023    ANIONGAP 8 (L) 12/27/2023    MG 2.06 12/26/2023    ALBUMIN 4.3 12/26/2023    AMYLASE 59 10/21/2021    LIPASE 14 12/26/2023    GFRMALE 79 02/08/2022     Lab Results   Component Value Date    TRIG 91 12/26/2023    CHOL 117 12/26/2023    LDLCALC 35 12/26/2023    HDL 64.3 12/26/2023     Lab Results   Component Value Date    HGBA1C 5.7 (H) 12/26/2023       Current Outpatient Medications on File Prior to Visit    Medication Sig Dispense Refill    alirocumab (Praluent Pen) 75 mg/mL pen injector Inject 75 mg under the skin every 14 (fourteen) days. 2 mL 11    amLODIPine (Norvasc) 2.5 mg tablet Take 1 tablet (2.5 mg) by mouth once daily.      aspirin 81 mg EC tablet Take by mouth.      coenzyme Q-10 300 mg capsule capsule Take by mouth once daily.      DULoxetine (Cymbalta) 30 mg DR capsule Take 1 capsule (30 mg) by mouth once daily. 90 capsule 3    famotidine (Pepcid) 20 mg tablet Take 1 tablet (20 mg) by mouth once daily.      melatonin 3 mg capsule Take 3 mg by mouth once daily at bedtime.      multivit-min-FA-lycopen-lutein (Essential Man 50 Plus) 0.4-2-250 mg-mg-mcg tablet Take 1 tablet by mouth once daily.      pravastatin (Pravachol) 80 mg tablet TAKE 1 TABLET BY MOUTH EVERYDAY AT BEDTIME 90 tablet 3    tamsulosin (Flomax) 0.4 mg 24 hr capsule Take 1 capsule (0.4 mg) by mouth once daily. 90 capsule 3     No current facility-administered medications on file prior to visit.        Assessment/Plan:  CONTINUE all medications as prescribed  Educate on Praluent use and side effects; muscle pain, joint pain, etc.  FUV Prn with pharmacy     Continue all meds under the continuation of care with the referring provider and clinical pharmacy team.    Jeovanny Sibley PharmD     Verbal consent to manage patient's drug therapy was obtained from [the patient and/or an individual authorized to act on behalf of a patient]. They were informed they may decline to participate or withdraw from participation in pharmacy services at any time.

## 2024-03-09 PROCEDURE — RXMED WILLOW AMBULATORY MEDICATION CHARGE

## 2024-03-14 ENCOUNTER — PHARMACY VISIT (OUTPATIENT)
Dept: PHARMACY | Facility: CLINIC | Age: 74
End: 2024-03-14
Payer: MEDICARE

## 2024-03-21 DIAGNOSIS — I10 ESSENTIAL (PRIMARY) HYPERTENSION: ICD-10-CM

## 2024-03-21 RX ORDER — AMLODIPINE BESYLATE 2.5 MG/1
2.5 TABLET ORAL DAILY
Qty: 90 TABLET | Refills: 3 | Status: SHIPPED | OUTPATIENT
Start: 2024-03-21

## 2024-03-25 ENCOUNTER — APPOINTMENT (OUTPATIENT)
Dept: CARDIOLOGY | Facility: CLINIC | Age: 74
End: 2024-03-25
Payer: MEDICARE

## 2024-04-06 PROCEDURE — RXMED WILLOW AMBULATORY MEDICATION CHARGE

## 2024-04-10 ENCOUNTER — NURSE TRIAGE (OUTPATIENT)
Dept: PRIMARY CARE | Facility: CLINIC | Age: 74
End: 2024-04-10
Payer: MEDICARE

## 2024-04-12 ENCOUNTER — PHARMACY VISIT (OUTPATIENT)
Dept: PHARMACY | Facility: CLINIC | Age: 74
End: 2024-04-12
Payer: MEDICARE

## 2024-05-04 PROCEDURE — RXMED WILLOW AMBULATORY MEDICATION CHARGE

## 2024-05-07 ENCOUNTER — PHARMACY VISIT (OUTPATIENT)
Dept: PHARMACY | Facility: CLINIC | Age: 74
End: 2024-05-07
Payer: MEDICARE

## 2024-05-31 PROCEDURE — RXMED WILLOW AMBULATORY MEDICATION CHARGE

## 2024-06-04 ENCOUNTER — PHARMACY VISIT (OUTPATIENT)
Dept: PHARMACY | Facility: CLINIC | Age: 74
End: 2024-06-04
Payer: MEDICARE

## 2024-07-03 DIAGNOSIS — R35.0 BENIGN PROSTATIC HYPERPLASIA WITH URINARY FREQUENCY: ICD-10-CM

## 2024-07-03 DIAGNOSIS — N40.1 BENIGN PROSTATIC HYPERPLASIA WITH URINARY FREQUENCY: ICD-10-CM

## 2024-07-08 RX ORDER — TAMSULOSIN HYDROCHLORIDE 0.4 MG/1
0.4 CAPSULE ORAL DAILY
Qty: 90 CAPSULE | Refills: 3 | Status: SHIPPED | OUTPATIENT
Start: 2024-07-08 | End: 2024-07-11 | Stop reason: SDUPTHER

## 2024-07-11 DIAGNOSIS — N40.1 BENIGN PROSTATIC HYPERPLASIA WITH URINARY FREQUENCY: ICD-10-CM

## 2024-07-11 DIAGNOSIS — R35.0 BENIGN PROSTATIC HYPERPLASIA WITH URINARY FREQUENCY: ICD-10-CM

## 2024-07-11 RX ORDER — TAMSULOSIN HYDROCHLORIDE 0.4 MG/1
0.4 CAPSULE ORAL DAILY
Qty: 90 CAPSULE | Refills: 3 | Status: SHIPPED | OUTPATIENT
Start: 2024-07-11

## 2024-07-29 ENCOUNTER — APPOINTMENT (OUTPATIENT)
Dept: CARDIOLOGY | Facility: CLINIC | Age: 74
End: 2024-07-29
Payer: MEDICARE

## 2024-07-29 VITALS
DIASTOLIC BLOOD PRESSURE: 68 MMHG | OXYGEN SATURATION: 96 % | WEIGHT: 184 LBS | HEART RATE: 52 BPM | BODY MASS INDEX: 26.4 KG/M2 | SYSTOLIC BLOOD PRESSURE: 148 MMHG

## 2024-07-29 DIAGNOSIS — G47.30 SLEEP APNEA, UNSPECIFIED TYPE: ICD-10-CM

## 2024-07-29 DIAGNOSIS — I10 ESSENTIAL (PRIMARY) HYPERTENSION: ICD-10-CM

## 2024-07-29 DIAGNOSIS — I65.09 VERTEBRAL ARTERY STENOSIS, UNSPECIFIED LATERALITY: ICD-10-CM

## 2024-07-29 DIAGNOSIS — I25.10 ARTERIOSCLEROSIS OF CORONARY ARTERY: ICD-10-CM

## 2024-07-29 DIAGNOSIS — R09.89 CAROTID BRUIT, UNSPECIFIED LATERALITY: ICD-10-CM

## 2024-07-29 DIAGNOSIS — I15.9 BENIGN SECONDARY HYPERTENSION: Primary | ICD-10-CM

## 2024-07-29 DIAGNOSIS — E78.00 PURE HYPERCHOLESTEROLEMIA: ICD-10-CM

## 2024-07-29 PROCEDURE — 3078F DIAST BP <80 MM HG: CPT | Performed by: INTERNAL MEDICINE

## 2024-07-29 PROCEDURE — 3077F SYST BP >= 140 MM HG: CPT | Performed by: INTERNAL MEDICINE

## 2024-07-29 PROCEDURE — 1036F TOBACCO NON-USER: CPT | Performed by: INTERNAL MEDICINE

## 2024-07-29 PROCEDURE — 1159F MED LIST DOCD IN RCRD: CPT | Performed by: INTERNAL MEDICINE

## 2024-07-29 PROCEDURE — G2211 COMPLEX E/M VISIT ADD ON: HCPCS | Performed by: INTERNAL MEDICINE

## 2024-07-29 PROCEDURE — 99214 OFFICE O/P EST MOD 30 MIN: CPT | Performed by: INTERNAL MEDICINE

## 2024-07-29 PROCEDURE — 1160F RVW MEDS BY RX/DR IN RCRD: CPT | Performed by: INTERNAL MEDICINE

## 2024-07-29 RX ORDER — AMLODIPINE BESYLATE 5 MG/1
5 TABLET ORAL DAILY
Qty: 90 TABLET | Refills: 3 | Status: SHIPPED | OUTPATIENT
Start: 2024-07-29 | End: 2025-07-29

## 2024-07-29 RX ORDER — PSYLLIUM HUSK 0.4 G
CAPSULE ORAL
COMMUNITY

## 2024-07-29 RX ORDER — ARMODAFINIL 200 MG/1
TABLET ORAL
COMMUNITY

## 2024-07-29 NOTE — PROGRESS NOTES
Chief Complaint:   Follow-up (patient is here for 6 month follow up)     History Of Present Illness:    Lucho Alexander is a 73 y.o. male presenting for general cardiac follow up  Diagnosed with occluded L vertebral artery at [facility]  Notes HA but not dizziness/LH  Patient denies chest pain/SOB/palpitations/dizziness/lightheadedness/edema/claudication  Active-walks several times per week,20 minutes  Has some burning in the throat  Last Recorded Vitals:  Vitals:    07/29/24 0925 07/29/24 0956   BP: 150/75 148/68   BP Location: Left arm    Patient Position: Sitting    BP Cuff Size: Adult    Pulse: 52    SpO2: 96%    Weight: 83.5 kg (184 lb)             Allergies:  Atorvastatin    Outpatient Medications:  Current Outpatient Medications   Medication Instructions    amLODIPine (NORVASC) 2.5 mg, oral, Daily    armodafinil (Nuvigil) 200 mg tablet oral    aspirin 81 mg EC tablet oral    coenzyme Q-10 300 mg capsule capsule oral, Daily    DULoxetine (CYMBALTA) 30 mg, oral, Daily    famotidine (PEPCID) 20 mg, oral, Daily    melatonin 3 mg, oral, Nightly    multivit-min-FA-lycopen-lutein (Essential Man 50 Plus) 0.4-2-250 mg-mg-mcg tablet 1 tablet, oral, Daily    Praluent Pen 75 mg, subcutaneous, Every 14 days    pravastatin (PRAVACHOL) 80 mg, oral, Nightly    psyllium (MetamuciL) 0.4 gram capsule oral    tamsulosin (FLOMAX) 0.4 mg, oral, Daily       Physical Exam:  Constitutional:       Appearance: Healthy appearance. Not in distress.   Neck:      Vascular: No JVR. JVD normal.   Pulmonary:      Effort: Pulmonary effort is normal.      Breath sounds: Normal breath sounds. No wheezing. No rhonchi. No rales.   Chest:      Chest wall: Not tender to palpatation.   Cardiovascular:      PMI at left midclavicular line. Normal rate. Regular rhythm. Normal S1. Normal S2.       Murmurs: There is no murmur.      No gallop.  No click. No rub.   Pulses:     Intact distal pulses.   Edema:     Peripheral edema absent.   Abdominal:      General:  Bowel sounds are normal.      Palpations: Abdomen is soft.      Tenderness: There is no abdominal tenderness.   Musculoskeletal: Normal range of motion.         General: No tenderness. Skin:     General: Skin is warm and dry.   Neurological:      General: No focal deficit present.      Mental Status: Alert and oriented to person, place and time.          Last Labs:  CBC -  Lab Results   Component Value Date    WBC 7.6 12/27/2023    HGB 15.2 12/27/2023    HCT 44.3 12/27/2023    MCV 92 12/27/2023     12/27/2023       CMP -  Lab Results   Component Value Date    CALCIUM 9.2 12/27/2023    PROT 7.0 12/26/2023    ALBUMIN 4.3 12/26/2023    AST 25 12/26/2023    ALT 21 12/26/2023    ALKPHOS 81 12/26/2023    BILITOT 0.8 12/26/2023       LIPID PANEL -   Lab Results   Component Value Date    CHOL 117 12/26/2023    TRIG 91 12/26/2023    HDL 64.3 12/26/2023    CHHDL 1.8 12/26/2023    LDLF 50 09/29/2023    VLDL 18 12/26/2023    NHDL 53 12/26/2023       RENAL FUNCTION PANEL -   Lab Results   Component Value Date    GLUCOSE 118 (H) 12/27/2023     12/27/2023    K 3.5 12/27/2023     12/27/2023    CO2 33 (H) 12/27/2023    ANIONGAP 8 (L) 12/27/2023    BUN 16 12/27/2023    CREATININE 0.89 12/27/2023    GFRMALE 79 02/08/2022    CALCIUM 9.2 12/27/2023    ALBUMIN 4.3 12/26/2023        Lab Results   Component Value Date    BNP 49 12/26/2023    HGBA1C 5.7 (H) 12/26/2023    HGBA1C 4.5 12/02/2021           Lab review: I have personally reviewed the laboratory result(s)       Problem List Items Addressed This Visit       Arteriosclerosis of coronary artery    Overview     Per 2007 cath - moderate 3 vessel dz  Had CP admission 12/2023-R/O for MI.  1/2024 stress test with average exercise tolerance with NL perfusion/NL LVEF    Continue ASA / statin  No beta blocker with prior bradycardia         Benign secondary hypertension - Primary    Overview     BP elevated-add amlodipine         Carotid bruit    Overview     Less than 50%  stenosis per 1/2024 CT         Hyperlipidemia    Overview     Was on Livalo/Zetia with 9/2019 LDL=71 (had not tolerated high intensity statins). Changed Livalo to pravastatin because of insurance.  5/2020 LDL=86,increased pravastatin  Then on pravastatin / ezetimibe  10/2022 VRT=318  Started PCSK9 inhibitor (Praluent)   12/2023 lipids excellent         Sleep apnea    Overview     On Bi Pap         Vertebral artery narrowing    Overview     Noted on 1 /2024 scan at [facility]  No dizziness/LH  Control BP  On ASA/statin            Other Visit Diagnoses       Essential (primary) hypertension            Lipids =DEC  Increase amlodipine to 5 mg daily for BP    Robb Rico, DO

## 2024-08-06 DIAGNOSIS — F32.A DEPRESSION, UNSPECIFIED DEPRESSION TYPE: ICD-10-CM

## 2024-08-06 RX ORDER — DULOXETIN HYDROCHLORIDE 30 MG/1
30 CAPSULE, DELAYED RELEASE ORAL DAILY
Qty: 90 CAPSULE | Refills: 3 | Status: SHIPPED | OUTPATIENT
Start: 2024-08-06

## 2024-08-09 PROCEDURE — RXMED WILLOW AMBULATORY MEDICATION CHARGE

## 2024-08-12 ENCOUNTER — PHARMACY VISIT (OUTPATIENT)
Dept: PHARMACY | Facility: CLINIC | Age: 74
End: 2024-08-12
Payer: MEDICARE

## 2024-09-09 ENCOUNTER — APPOINTMENT (OUTPATIENT)
Dept: PRIMARY CARE | Facility: CLINIC | Age: 74
End: 2024-09-09
Payer: MEDICARE

## 2024-09-09 VITALS
WEIGHT: 185.8 LBS | TEMPERATURE: 97.6 F | HEART RATE: 70 BPM | HEIGHT: 70 IN | OXYGEN SATURATION: 96 % | SYSTOLIC BLOOD PRESSURE: 130 MMHG | DIASTOLIC BLOOD PRESSURE: 72 MMHG | BODY MASS INDEX: 26.6 KG/M2

## 2024-09-09 DIAGNOSIS — I15.9 BENIGN SECONDARY HYPERTENSION: ICD-10-CM

## 2024-09-09 DIAGNOSIS — S83.92XA SPRAIN OF LEFT KNEE, UNSPECIFIED LIGAMENT, INITIAL ENCOUNTER: Primary | ICD-10-CM

## 2024-09-09 PROCEDURE — 3008F BODY MASS INDEX DOCD: CPT | Performed by: FAMILY MEDICINE

## 2024-09-09 PROCEDURE — 1160F RVW MEDS BY RX/DR IN RCRD: CPT | Performed by: FAMILY MEDICINE

## 2024-09-09 PROCEDURE — 3078F DIAST BP <80 MM HG: CPT | Performed by: FAMILY MEDICINE

## 2024-09-09 PROCEDURE — 3075F SYST BP GE 130 - 139MM HG: CPT | Performed by: FAMILY MEDICINE

## 2024-09-09 PROCEDURE — 99213 OFFICE O/P EST LOW 20 MIN: CPT | Performed by: FAMILY MEDICINE

## 2024-09-09 PROCEDURE — 1036F TOBACCO NON-USER: CPT | Performed by: FAMILY MEDICINE

## 2024-09-09 PROCEDURE — 1125F AMNT PAIN NOTED PAIN PRSNT: CPT | Performed by: FAMILY MEDICINE

## 2024-09-09 PROCEDURE — 1159F MED LIST DOCD IN RCRD: CPT | Performed by: FAMILY MEDICINE

## 2024-09-09 RX ORDER — PANTOPRAZOLE SODIUM 40 MG/1
1 TABLET, DELAYED RELEASE ORAL
COMMUNITY
Start: 2024-08-26

## 2024-09-09 ASSESSMENT — ENCOUNTER SYMPTOMS
OCCASIONAL FEELINGS OF UNSTEADINESS: 0
LOSS OF SENSATION IN FEET: 0
CONSTITUTIONAL NEGATIVE: 1
DEPRESSION: 0

## 2024-09-09 ASSESSMENT — PAIN SCALES - GENERAL: PAINLEVEL: 3

## 2024-09-09 NOTE — PROGRESS NOTES
"Subjective   Patient ID: Lucho Alexander is a 73 y.o. male who presents for c/o (Left knee pain (fell) x 3 wks).    HPI     Review of Systems   Constitutional: Negative.    Musculoskeletal:         Pain left knee after fall 3 weeks ago some better today       Objective   /72 (BP Location: Left arm)   Pulse 70   Temp 36.4 °C (97.6 °F) (Temporal)   Ht 1.778 m (5' 10\")   Wt 84.3 kg (185 lb 12.8 oz)   SpO2 96%   BMI 26.66 kg/m²     Physical Exam  Vitals and nursing note reviewed.   Constitutional:       Appearance: Normal appearance.   Cardiovascular:      Rate and Rhythm: Regular rhythm.   Musculoskeletal:      Comments: Normal swelling left knee.  Negative drawers and Ender's sign.   Neurological:      General: No focal deficit present.      Mental Status: He is alert and oriented to person, place, and time.   Psychiatric:         Mood and Affect: Mood normal.         Behavior: Behavior normal.         Assessment/Plan patient seen here after suffering a fall he injured his left knee he does not seem to have any significant injury at this time he will continue with some ice.  Will see him back as needed  Problem List Items Addressed This Visit             ICD-10-CM    Benign secondary hypertension I15.9     Other Visit Diagnoses         Codes    Sprain of left knee, unspecified ligament, initial encounter    -  Primary S83.92XA               "

## 2024-09-20 PROCEDURE — RXMED WILLOW AMBULATORY MEDICATION CHARGE

## 2024-09-23 ENCOUNTER — PHARMACY VISIT (OUTPATIENT)
Dept: PHARMACY | Facility: CLINIC | Age: 74
End: 2024-09-23
Payer: MEDICARE

## 2024-10-02 PROBLEM — K57.90 DIVERTICULOSIS: Status: ACTIVE | Noted: 2024-08-26

## 2024-10-02 PROBLEM — K63.5 POLYP OF COLON: Status: ACTIVE | Noted: 2024-08-26

## 2024-10-02 PROBLEM — K64.8 HEMORRHOIDS, INTERNAL: Status: ACTIVE | Noted: 2024-08-26

## 2024-10-02 RX ORDER — RESPIRATORY SYNCYTIAL VISUS VACCINE RECOMBINANT, ADJUVANTED 120MCG/0.5
KIT INTRAMUSCULAR
COMMUNITY
Start: 2023-12-12

## 2024-10-02 RX ORDER — INFLUENZA A VIRUS A/VICTORIA/4897/2022 IVR-238 (H1N1) ANTIGEN (FORMALDEHYDE INACTIVATED), INFLUENZA A VIRUS A/DARWIN/9/2021 SAN-010 (H3N2) ANTIGEN (FORMALDEHYDE INACTIVATED), INFLUENZA B VIRUS B/PHUKET/3073/2013 ANTIGEN (FORMALDEHYDE INACTIVATED), AND INFLUENZA B VIRUS B/MICHIGAN/01/2021 ANTIGEN (FORMALDEHYDE INACTIVATED) 60; 60; 60; 60 UG/.7ML; UG/.7ML; UG/.7ML; UG/.7ML
INJECTION, SUSPENSION INTRAMUSCULAR
COMMUNITY
Start: 2023-10-08

## 2024-10-02 RX ORDER — COVID-19 VACCINE, MRNA 0.05 MG/.48ML
INJECTION, SUSPENSION INTRAMUSCULAR
COMMUNITY
Start: 2023-10-08

## 2024-10-02 RX ORDER — AZITHROMYCIN 250 MG/1
TABLET, FILM COATED ORAL
COMMUNITY
Start: 2024-01-04 | End: 2024-10-10 | Stop reason: ALTCHOICE

## 2024-10-10 ENCOUNTER — APPOINTMENT (OUTPATIENT)
Dept: PRIMARY CARE | Facility: CLINIC | Age: 74
End: 2024-10-10
Payer: MEDICARE

## 2024-10-10 VITALS
WEIGHT: 185.2 LBS | HEIGHT: 70 IN | SYSTOLIC BLOOD PRESSURE: 122 MMHG | DIASTOLIC BLOOD PRESSURE: 78 MMHG | TEMPERATURE: 97.6 F | BODY MASS INDEX: 26.51 KG/M2

## 2024-10-10 DIAGNOSIS — R35.0 BENIGN PROSTATIC HYPERPLASIA WITH URINARY FREQUENCY: ICD-10-CM

## 2024-10-10 DIAGNOSIS — N40.1 BENIGN PROSTATIC HYPERPLASIA WITH LOWER URINARY TRACT SYMPTOMS, SYMPTOM DETAILS UNSPECIFIED: ICD-10-CM

## 2024-10-10 DIAGNOSIS — F32.A DEPRESSION, UNSPECIFIED DEPRESSION TYPE: ICD-10-CM

## 2024-10-10 DIAGNOSIS — Z00.00 MEDICARE ANNUAL WELLNESS VISIT, SUBSEQUENT: Primary | ICD-10-CM

## 2024-10-10 DIAGNOSIS — E78.00 PURE HYPERCHOLESTEROLEMIA: ICD-10-CM

## 2024-10-10 DIAGNOSIS — I15.9 BENIGN SECONDARY HYPERTENSION: ICD-10-CM

## 2024-10-10 DIAGNOSIS — G47.30 SLEEP APNEA, UNSPECIFIED TYPE: ICD-10-CM

## 2024-10-10 DIAGNOSIS — N40.1 BENIGN PROSTATIC HYPERPLASIA WITH URINARY FREQUENCY: ICD-10-CM

## 2024-10-10 DIAGNOSIS — E66.3 OVERWEIGHT (BMI 25.0-29.9): ICD-10-CM

## 2024-10-10 LAB
ALBUMIN SERPL BCP-MCNC: 4.5 G/DL (ref 3.4–5)
ALP SERPL-CCNC: 65 U/L (ref 33–136)
ALT SERPL W P-5'-P-CCNC: 20 U/L (ref 10–52)
ANION GAP SERPL CALC-SCNC: 10 MMOL/L (ref 10–20)
AST SERPL W P-5'-P-CCNC: 18 U/L (ref 9–39)
BASOPHILS # BLD AUTO: 0.01 X10*3/UL (ref 0–0.1)
BASOPHILS NFR BLD AUTO: 0.1 %
BILIRUB SERPL-MCNC: 0.6 MG/DL (ref 0–1.2)
BUN SERPL-MCNC: 22 MG/DL (ref 6–23)
CALCIUM SERPL-MCNC: 9.6 MG/DL (ref 8.6–10.6)
CHLORIDE SERPL-SCNC: 103 MMOL/L (ref 98–107)
CHOLEST SERPL-MCNC: 188 MG/DL (ref 0–199)
CHOLESTEROL/HDL RATIO: 2.5
CO2 SERPL-SCNC: 35 MMOL/L (ref 21–32)
CREAT SERPL-MCNC: 0.81 MG/DL (ref 0.5–1.3)
EGFRCR SERPLBLD CKD-EPI 2021: >90 ML/MIN/1.73M*2
EOSINOPHIL # BLD AUTO: 0.01 X10*3/UL (ref 0–0.4)
EOSINOPHIL NFR BLD AUTO: 0.1 %
ERYTHROCYTE [DISTWIDTH] IN BLOOD BY AUTOMATED COUNT: 12.7 % (ref 11.5–14.5)
GLUCOSE SERPL-MCNC: 108 MG/DL (ref 74–99)
HCT VFR BLD AUTO: 46.1 % (ref 41–52)
HDLC SERPL-MCNC: 76.4 MG/DL
HGB BLD-MCNC: 15.2 G/DL (ref 13.5–17.5)
IMM GRANULOCYTES # BLD AUTO: 0.03 X10*3/UL (ref 0–0.5)
IMM GRANULOCYTES NFR BLD AUTO: 0.3 % (ref 0–0.9)
LDLC SERPL CALC-MCNC: 89 MG/DL
LYMPHOCYTES # BLD AUTO: 2.53 X10*3/UL (ref 0.8–3)
LYMPHOCYTES NFR BLD AUTO: 28.8 %
MCH RBC QN AUTO: 31.3 PG (ref 26–34)
MCHC RBC AUTO-ENTMCNC: 33 G/DL (ref 32–36)
MCV RBC AUTO: 95 FL (ref 80–100)
MONOCYTES # BLD AUTO: 0.8 X10*3/UL (ref 0.05–0.8)
MONOCYTES NFR BLD AUTO: 9.1 %
NEUTROPHILS # BLD AUTO: 5.42 X10*3/UL (ref 1.6–5.5)
NEUTROPHILS NFR BLD AUTO: 61.6 %
NON HDL CHOLESTEROL: 112 MG/DL (ref 0–149)
NRBC BLD-RTO: 0 /100 WBCS (ref 0–0)
PLATELET # BLD AUTO: 217 X10*3/UL (ref 150–450)
POTASSIUM SERPL-SCNC: 4 MMOL/L (ref 3.5–5.3)
PROT SERPL-MCNC: 7 G/DL (ref 6.4–8.2)
PSA SERPL-MCNC: 1.48 NG/ML
RBC # BLD AUTO: 4.86 X10*6/UL (ref 4.5–5.9)
SODIUM SERPL-SCNC: 144 MMOL/L (ref 136–145)
TRIGL SERPL-MCNC: 113 MG/DL (ref 0–149)
VLDL: 23 MG/DL (ref 0–40)
WBC # BLD AUTO: 8.8 X10*3/UL (ref 4.4–11.3)

## 2024-10-10 PROCEDURE — 1036F TOBACCO NON-USER: CPT | Performed by: FAMILY MEDICINE

## 2024-10-10 PROCEDURE — 1170F FXNL STATUS ASSESSED: CPT | Performed by: FAMILY MEDICINE

## 2024-10-10 PROCEDURE — 80053 COMPREHEN METABOLIC PANEL: CPT

## 2024-10-10 PROCEDURE — 85025 COMPLETE CBC W/AUTO DIFF WBC: CPT

## 2024-10-10 PROCEDURE — 1160F RVW MEDS BY RX/DR IN RCRD: CPT | Performed by: FAMILY MEDICINE

## 2024-10-10 PROCEDURE — 1159F MED LIST DOCD IN RCRD: CPT | Performed by: FAMILY MEDICINE

## 2024-10-10 PROCEDURE — 84153 ASSAY OF PSA TOTAL: CPT

## 2024-10-10 PROCEDURE — 99397 PER PM REEVAL EST PAT 65+ YR: CPT | Performed by: FAMILY MEDICINE

## 2024-10-10 PROCEDURE — 3078F DIAST BP <80 MM HG: CPT | Performed by: FAMILY MEDICINE

## 2024-10-10 PROCEDURE — G0439 PPPS, SUBSEQ VISIT: HCPCS | Performed by: FAMILY MEDICINE

## 2024-10-10 PROCEDURE — 3008F BODY MASS INDEX DOCD: CPT | Performed by: FAMILY MEDICINE

## 2024-10-10 PROCEDURE — 99497 ADVNCD CARE PLAN 30 MIN: CPT | Performed by: FAMILY MEDICINE

## 2024-10-10 PROCEDURE — 1125F AMNT PAIN NOTED PAIN PRSNT: CPT | Performed by: FAMILY MEDICINE

## 2024-10-10 PROCEDURE — 3074F SYST BP LT 130 MM HG: CPT | Performed by: FAMILY MEDICINE

## 2024-10-10 PROCEDURE — 80061 LIPID PANEL: CPT

## 2024-10-10 PROCEDURE — 1124F ACP DISCUSS-NO DSCNMKR DOCD: CPT | Performed by: FAMILY MEDICINE

## 2024-10-10 RX ORDER — LORAZEPAM 1 MG/1
TABLET ORAL
COMMUNITY
Start: 2024-09-10 | End: 2024-10-10 | Stop reason: ALTCHOICE

## 2024-10-10 RX ORDER — METHYLPREDNISOLONE 4 MG/1
TABLET ORAL
COMMUNITY
Start: 2024-10-07 | End: 2024-10-10 | Stop reason: ALTCHOICE

## 2024-10-10 RX ORDER — IBUPROFEN 800 MG/1
1 TABLET ORAL EVERY 6 HOURS
COMMUNITY
Start: 2024-09-30

## 2024-10-10 RX ORDER — AMOXICILLIN 500 MG/1
1 CAPSULE ORAL
COMMUNITY
Start: 2024-10-07

## 2024-10-10 ASSESSMENT — ENCOUNTER SYMPTOMS
PSYCHIATRIC NEGATIVE: 1
CARDIOVASCULAR NEGATIVE: 1
NEUROLOGICAL NEGATIVE: 1
ENDOCRINE NEGATIVE: 1
CONSTITUTIONAL NEGATIVE: 1
LOSS OF SENSATION IN FEET: 0
GASTROINTESTINAL NEGATIVE: 1
OCCASIONAL FEELINGS OF UNSTEADINESS: 0
MUSCULOSKELETAL NEGATIVE: 1
DEPRESSION: 0
RESPIRATORY NEGATIVE: 1

## 2024-10-10 ASSESSMENT — ACTIVITIES OF DAILY LIVING (ADL)
NEEDS ASSISTANCE WITH FOOD: INDEPENDENT
DOING HOUSEWORK: INDEPENDENT
WALKS IN HOME: INDEPENDENT
PREPARING MEALS: INDEPENDENT
DRESSING YOURSELF: INDEPENDENT
GROOMING: INDEPENDENT
BATHING: INDEPENDENT
TOILETING: INDEPENDENT
DRESSING: INDEPENDENT
FEEDING: INDEPENDENT
USING TELEPHONE: INDEPENDENT
FEEDING YOURSELF: INDEPENDENT
JUDGMENT_ADEQUATE_SAFELY_COMPLETE_DAILY_ACTIVITIES: YES
JUDGMENT_ADEQUATE_SAFELY_COMPLETE_DAILY_ACTIVITIES: YES
ADEQUATE_TO_COMPLETE_ADL: YES
ADEQUATE_TO_COMPLETE_ADL: YES
PATIENT'S MEMORY ADEQUATE TO SAFELY COMPLETE DAILY ACTIVITIES?: YES
BATHING: INDEPENDENT
TAKING MEDICATION: INDEPENDENT
GROCERY SHOPPING: INDEPENDENT
TOILETING: INDEPENDENT
USING TRANSPORTATION: INDEPENDENT
MANAGING FINANCES: INDEPENDENT
EATING: INDEPENDENT

## 2024-10-10 ASSESSMENT — ANXIETY QUESTIONNAIRES
GAD7 TOTAL SCORE: 0
7. FEELING AFRAID AS IF SOMETHING AWFUL MIGHT HAPPEN: NOT AT ALL
4. TROUBLE RELAXING: NOT AT ALL
2. NOT BEING ABLE TO STOP OR CONTROL WORRYING: NOT AT ALL
5. BEING SO RESTLESS THAT IT IS HARD TO SIT STILL: NOT AT ALL
6. BECOMING EASILY ANNOYED OR IRRITABLE: NOT AT ALL
1. FEELING NERVOUS, ANXIOUS, OR ON EDGE: NOT AT ALL
3. WORRYING TOO MUCH ABOUT DIFFERENT THINGS: NOT AT ALL

## 2024-10-10 ASSESSMENT — GERIATRIC MINI NUTRITIONAL ASSESSMENT (MNA)
C GENERAL MOBILITY: GOES OUT
B WEIGHT LOSS DURING THE LAST 3 MONTHS: NO WEIGHT LOSS
A HAS FOOD INTAKE DECLINED OVER THE PAST 3 MONTHS DUE TO LOSS OF APPETITE, DIGESTIVE PROBLEMS, CHEWING OR SWALLOWING DIFFICULTIES?: NO DECREASE IN FOOD INTAKE
D HAS SUFFERED PSYCHOLOGICAL STRESS OR ACUTE DISEASE IN THE PAST 3 MONTHS?: NO
E NEUROPSYCHOLOGICAL PROBLEMS: NO PSYCHOLOGICAL PROBLEMS

## 2024-10-10 ASSESSMENT — PAIN SCALES - GENERAL: PAINLEVEL: 3

## 2024-10-10 NOTE — ACP (ADVANCE CARE PLANNING)
Confirming Previous Code Status:   Advance Care Planning Note     Discussion Date: 10/10/24   Discussion Participants: patient    The patient wishes to discuss Advance Care Planning today and the following is a brief summary of our discussion.     Patient has capacity to make their own medical decisions: Yes  Health Care Agent/Surrogate Decision Maker documented in chart: Yes    Documents on file and valid:  Advance Directive/Living Will: Yes   Health Care Power of : Yes  Other: none    Communication of Medical Status/Prognosis:   yes     Communication of Treatment Goals/Options:   yes     Treatment Decisions  Goals of Care: survival is paramount regardless of prognosis, treatment outcome, or burden   yes  Follow Up Plan  no  Team Members  myself  Time Statement: Total face to face time spent on advance care planning was 16 minutes with 16 minutes spent in counseling, including the explanation.    Andrez Leyva DO  10/10/2024 10:14 AM

## 2024-10-10 NOTE — PROGRESS NOTES
"Subjective   Patient ID: Lucho Alexander is a 73 y.o. male who presents for Annual Exam (Annual medicare wellness fasting bw).    HPI     Review of Systems   Constitutional: Negative.    Respiratory: Negative.          Sleep apnea BiPap   Cardiovascular: Negative.         DR Rico   Gastrointestinal: Negative.         Dr Jordan UGIE and colonoscopy   Endocrine: Negative.    Genitourinary: Negative.    Musculoskeletal: Negative.    Neurological: Negative.         Dr Richardson   Psychiatric/Behavioral: Negative.         Objective   /78 (BP Location: Left arm)   Temp 36.4 °C (97.6 °F) (Temporal)   Ht 1.778 m (5' 10\")   Wt 84 kg (185 lb 3.2 oz)   BMI 26.57 kg/m²     Physical Exam  Vitals and nursing note reviewed.   Constitutional:       Appearance: Normal appearance.   HENT:      Right Ear: Tympanic membrane normal.      Left Ear: Tympanic membrane normal.      Mouth/Throat:      Pharynx: Oropharynx is clear.   Cardiovascular:      Rate and Rhythm: Normal rate and regular rhythm.      Pulses: Normal pulses.      Heart sounds: Normal heart sounds.   Pulmonary:      Breath sounds: Normal breath sounds.   Abdominal:      Palpations: Abdomen is soft.   Neurological:      General: No focal deficit present.      Mental Status: He is alert and oriented to person, place, and time.   Psychiatric:         Mood and Affect: Mood normal.         Behavior: Behavior normal.         Assessment/Plan patient seen here for a annual Medicare wellness exam.  We reviewed his questionnaire he is agreeable to his responses.  We did discuss advanced directives.  He has no significant difficulty with depression or anxiety.  We are drawing his lab work here today let him know those results as soon as available.  Will see him back in a year  Problem List Items Addressed This Visit             ICD-10-CM    Benign secondary hypertension I15.9    Relevant Orders    Prostate Specific Antigen    BPH (benign prostatic hyperplasia) N40.0 "    Relevant Orders    Prostate Specific Antigen    Depression F32.A    Hyperlipidemia E78.5    Relevant Orders    Lipid Panel    CBC and Auto Differential    Comprehensive Metabolic Panel    Sleep apnea G47.30     Other Visit Diagnoses         Codes    Medicare annual wellness visit, subsequent    -  Primary Z00.00    Overweight (BMI 25.0-29.9)     E66.3

## 2024-10-22 PROCEDURE — RXMED WILLOW AMBULATORY MEDICATION CHARGE

## 2024-10-24 ENCOUNTER — PHARMACY VISIT (OUTPATIENT)
Dept: PHARMACY | Facility: CLINIC | Age: 74
End: 2024-10-24
Payer: MEDICARE

## 2024-11-18 PROCEDURE — RXMED WILLOW AMBULATORY MEDICATION CHARGE

## 2024-11-20 ENCOUNTER — PHARMACY VISIT (OUTPATIENT)
Dept: PHARMACY | Facility: CLINIC | Age: 74
End: 2024-11-20
Payer: MEDICARE

## 2024-12-14 PROCEDURE — RXMED WILLOW AMBULATORY MEDICATION CHARGE

## 2024-12-17 ENCOUNTER — PHARMACY VISIT (OUTPATIENT)
Dept: PHARMACY | Facility: CLINIC | Age: 74
End: 2024-12-17
Payer: MEDICARE

## 2025-01-14 PROBLEM — K29.50 CHRONIC GASTRITIS: Status: ACTIVE | Noted: 2024-09-17

## 2025-01-14 PROBLEM — K22.70 BARRETT'S ESOPHAGUS: Status: ACTIVE | Noted: 2024-09-17

## 2025-01-24 PROCEDURE — RXMED WILLOW AMBULATORY MEDICATION CHARGE

## 2025-01-28 ENCOUNTER — PHARMACY VISIT (OUTPATIENT)
Dept: PHARMACY | Facility: CLINIC | Age: 75
End: 2025-01-28
Payer: MEDICARE

## 2025-02-04 ENCOUNTER — APPOINTMENT (OUTPATIENT)
Dept: CARDIOLOGY | Facility: CLINIC | Age: 75
End: 2025-02-04
Payer: MEDICARE

## 2025-02-04 VITALS
DIASTOLIC BLOOD PRESSURE: 80 MMHG | OXYGEN SATURATION: 98 % | WEIGHT: 192.4 LBS | SYSTOLIC BLOOD PRESSURE: 120 MMHG | HEART RATE: 56 BPM | BODY MASS INDEX: 27.61 KG/M2

## 2025-02-04 DIAGNOSIS — E78.00 PURE HYPERCHOLESTEROLEMIA: ICD-10-CM

## 2025-02-04 DIAGNOSIS — G47.30 SLEEP APNEA, UNSPECIFIED TYPE: ICD-10-CM

## 2025-02-04 DIAGNOSIS — I15.9 BENIGN SECONDARY HYPERTENSION: Primary | ICD-10-CM

## 2025-02-04 DIAGNOSIS — I10 ESSENTIAL HYPERTENSION: ICD-10-CM

## 2025-02-04 DIAGNOSIS — R00.1 BRADYCARDIA: ICD-10-CM

## 2025-02-04 DIAGNOSIS — I25.10 ARTERIOSCLEROSIS OF CORONARY ARTERY: ICD-10-CM

## 2025-02-04 PROCEDURE — 99213 OFFICE O/P EST LOW 20 MIN: CPT | Mod: 25 | Performed by: INTERNAL MEDICINE

## 2025-02-04 PROCEDURE — 3078F DIAST BP <80 MM HG: CPT | Performed by: INTERNAL MEDICINE

## 2025-02-04 PROCEDURE — 93005 ELECTROCARDIOGRAM TRACING: CPT | Performed by: INTERNAL MEDICINE

## 2025-02-04 PROCEDURE — 3074F SYST BP LT 130 MM HG: CPT | Performed by: INTERNAL MEDICINE

## 2025-02-04 PROCEDURE — 99213 OFFICE O/P EST LOW 20 MIN: CPT | Performed by: INTERNAL MEDICINE

## 2025-02-04 PROCEDURE — 1036F TOBACCO NON-USER: CPT | Performed by: INTERNAL MEDICINE

## 2025-02-04 PROCEDURE — 93010 ELECTROCARDIOGRAM REPORT: CPT | Performed by: INTERNAL MEDICINE

## 2025-02-04 PROCEDURE — G2211 COMPLEX E/M VISIT ADD ON: HCPCS | Performed by: INTERNAL MEDICINE

## 2025-02-04 PROCEDURE — 1159F MED LIST DOCD IN RCRD: CPT | Performed by: INTERNAL MEDICINE

## 2025-02-04 NOTE — PROGRESS NOTES
Chief Complaint:   No chief complaint on file.     History Of Present Illness:    Lucho Alexander is a 74 y.o. male presenting for general cardiac follow up  Had some palpitations recently-was anxious  Patient denies chest pain/SOB/dizziness/lightheadedness/edema/claudication  Active-walks dog    Last Recorded Vitals:  Vitals:    02/04/25 0930   BP: 132/70   BP Location: Left arm   Patient Position: Sitting   BP Cuff Size: Adult   Pulse: 56   SpO2: 98%   Weight: 87.3 kg (192 lb 6.4 oz)            Allergies:  Atorvastatin    Outpatient Medications:  Current Outpatient Medications   Medication Instructions    amLODIPine (NORVASC) 5 mg, oral, Daily    Arexvy, PF, 120 mcg/0.5 mL suspension for reconstitution     aspirin 81 mg EC tablet Take by mouth.    coenzyme Q-10 300 mg capsule capsule Daily    Comirnaty 2023-24, 12y up,,PF, 30 mcg/0.3 mL suspension     DULoxetine (CYMBALTA) 30 mg, oral, Daily    Fluzone HighDose Quad 23-24 PF syringe     ibuprofen 800 mg tablet 1 tablet, Every 6 hours    melatonin 3 mg, Nightly    multivit-min-FA-lycopen-lutein (Essential Man 50 Plus) 0.4-2-250 mg-mg-mcg tablet 1 tablet, Daily    pantoprazole (ProtoNix) 40 mg EC tablet 1 tablet, Every 12 hours scheduled (0630,1830)    Praluent Pen 75 mg, subcutaneous, Every 14 days    psyllium (MetamuciL) 0.4 gram capsule Take by mouth.    tamsulosin (FLOMAX) 0.4 mg, oral, Daily       Physical Exam:  Constitutional:       Appearance: Healthy appearance. Not in distress.   Neck:      Vascular: No JVR. JVD normal.   Pulmonary:      Effort: Pulmonary effort is normal.      Breath sounds: Normal breath sounds. No wheezing. No rhonchi. No rales.   Chest:      Chest wall: Not tender to palpatation.   Cardiovascular:      PMI at left midclavicular line. Normal rate. Regular rhythm. Normal S1. Normal S2.       Murmurs: There is no murmur.      No gallop.  No click. No rub.   Pulses:     Intact distal pulses.   Edema:     Peripheral edema absent.    Abdominal:      General: Bowel sounds are normal.      Palpations: Abdomen is soft.      Tenderness: There is no abdominal tenderness.   Musculoskeletal: Normal range of motion.         General: No tenderness. Skin:     General: Skin is warm and dry.   Neurological:      General: No focal deficit present.      Mental Status: Alert and oriented to person, place and time.          Last Labs:  CBC -  Lab Results   Component Value Date    WBC 8.8 10/10/2024    HGB 15.2 10/10/2024    HCT 46.1 10/10/2024    MCV 95 10/10/2024     10/10/2024       CMP -  Lab Results   Component Value Date    CALCIUM 9.6 10/10/2024    PROT 7.0 10/10/2024    ALBUMIN 4.5 10/10/2024    AST 18 10/10/2024    ALT 20 10/10/2024    ALKPHOS 65 10/10/2024    BILITOT 0.6 10/10/2024       LIPID PANEL -   Lab Results   Component Value Date    CHOL 188 10/10/2024    TRIG 113 10/10/2024    HDL 76.4 10/10/2024    CHHDL 2.5 10/10/2024    LDLF 50 09/29/2023    VLDL 23 10/10/2024    NHDL 112 10/10/2024     Ldl=89  RENAL FUNCTION PANEL -   Lab Results   Component Value Date    GLUCOSE 108 (H) 10/10/2024     10/10/2024    K 4.0 10/10/2024     10/10/2024    CO2 35 (H) 10/10/2024    ANIONGAP 10 10/10/2024    BUN 22 10/10/2024    CREATININE 0.81 10/10/2024    GFRMALE 79 02/08/2022    CALCIUM 9.6 10/10/2024    ALBUMIN 4.5 10/10/2024        Lab Results   Component Value Date    BNP 49 12/26/2023    HGBA1C 5.7 (H) 12/26/2023    HGBA1C 4.5 12/02/2021           Lab review: I have personally reviewed the laboratory result(s)       Problem List Items Addressed This Visit       Arteriosclerosis of coronary artery    Overview     Per 2007 cath - moderate 3 vessel dz  Had CP admission 12/2023-R/O for MI.  1/2024 stress test with average exercise tolerance with NL perfusion/NL LVEF  NO angina/ischemic EKG changes  Continue ASA / statin  No beta blocker with prior bradycardia         Relevant Orders    ECG 12 lead (Clinic Performed) (Completed)    Benign  secondary hypertension - Primary    Overview     BP better on amlodipine         Bradycardia    Overview     Asymptomatic  On no negative chronotropic meds         Hyperlipidemia    Overview     Was on Livalo/Zetia with 9/2019 LDL=71 (had not tolerated high intensity statins). Changed Livalo to pravastatin because of insurance.  5/2020 LDL=86,increased pravastatin  Then on pravastatin / ezetimibe  10/2022 GYR=312  Started PCSK9 inhibitor (Praluent)   10/2024 lipids OK         Sleep apnea    Overview     On Bi Pap          Other Visit Diagnoses       Essential hypertension        Relevant Orders    ECG 12 lead (Clinic Performed) (Completed)          Stay active  Robb Rico DO

## 2025-02-06 LAB
ATRIAL RATE: 53 BPM
P AXIS: 33 DEGREES
P OFFSET: 173 MS
P ONSET: 129 MS
PR INTERVAL: 162 MS
Q ONSET: 210 MS
QRS COUNT: 9 BEATS
QRS DURATION: 92 MS
QT INTERVAL: 418 MS
QTC CALCULATION(BAZETT): 392 MS
QTC FREDERICIA: 401 MS
R AXIS: -43 DEGREES
T AXIS: 68 DEGREES
T OFFSET: 419 MS
VENTRICULAR RATE: 53 BPM

## 2025-02-17 DIAGNOSIS — E78.5 HYPERLIPIDEMIA, UNSPECIFIED HYPERLIPIDEMIA TYPE: ICD-10-CM

## 2025-02-17 RX ORDER — ALIROCUMAB 75 MG/ML
75 INJECTION, SOLUTION SUBCUTANEOUS
Qty: 2 ML | Refills: 11 | Status: SHIPPED | OUTPATIENT
Start: 2025-02-17

## 2025-03-03 DIAGNOSIS — E78.5 HYPERLIPIDEMIA, UNSPECIFIED HYPERLIPIDEMIA TYPE: ICD-10-CM

## 2025-03-03 RX ORDER — ALIROCUMAB 75 MG/ML
75 INJECTION, SOLUTION SUBCUTANEOUS
Qty: 2 ML | Refills: 11 | Status: SHIPPED | OUTPATIENT
Start: 2025-03-03

## 2025-03-31 PROCEDURE — RXMED WILLOW AMBULATORY MEDICATION CHARGE

## 2025-04-02 ENCOUNTER — PHARMACY VISIT (OUTPATIENT)
Dept: PHARMACY | Facility: CLINIC | Age: 75
End: 2025-04-02
Payer: MEDICARE

## 2025-04-23 PROCEDURE — RXMED WILLOW AMBULATORY MEDICATION CHARGE

## 2025-04-29 ENCOUNTER — PHARMACY VISIT (OUTPATIENT)
Dept: PHARMACY | Facility: CLINIC | Age: 75
End: 2025-04-29
Payer: MEDICARE

## 2025-05-21 PROCEDURE — RXMED WILLOW AMBULATORY MEDICATION CHARGE

## 2025-05-22 ENCOUNTER — PHARMACY VISIT (OUTPATIENT)
Dept: PHARMACY | Facility: CLINIC | Age: 75
End: 2025-05-22
Payer: MEDICARE

## 2025-05-28 ENCOUNTER — OFFICE VISIT (OUTPATIENT)
Dept: URGENT CARE | Age: 75
End: 2025-05-28
Payer: MEDICARE

## 2025-05-28 VITALS
TEMPERATURE: 98.4 F | DIASTOLIC BLOOD PRESSURE: 78 MMHG | SYSTOLIC BLOOD PRESSURE: 121 MMHG | BODY MASS INDEX: 25.48 KG/M2 | WEIGHT: 178 LBS | RESPIRATION RATE: 16 BRPM | HEIGHT: 70 IN | HEART RATE: 74 BPM | OXYGEN SATURATION: 97 %

## 2025-05-28 DIAGNOSIS — R68.89 FLU-LIKE SYMPTOMS: ICD-10-CM

## 2025-05-28 LAB
POC CORONAVIRUS SARS-COV-2 PCR: NEGATIVE
POC HUMAN RHINOVIRUS PCR: NEGATIVE
POC INFLUENZA A VIRUS PCR: NEGATIVE
POC INFLUENZA B VIRUS PCR: NEGATIVE
POC RESPIRATORY SYNCYTIAL VIRUS PCR: NEGATIVE

## 2025-05-28 PROCEDURE — 1159F MED LIST DOCD IN RCRD: CPT | Performed by: PHYSICIAN ASSISTANT

## 2025-05-28 PROCEDURE — 87631 RESP VIRUS 3-5 TARGETS: CPT | Performed by: PHYSICIAN ASSISTANT

## 2025-05-28 PROCEDURE — 99213 OFFICE O/P EST LOW 20 MIN: CPT | Performed by: PHYSICIAN ASSISTANT

## 2025-05-28 PROCEDURE — 3078F DIAST BP <80 MM HG: CPT | Performed by: PHYSICIAN ASSISTANT

## 2025-05-28 PROCEDURE — 1160F RVW MEDS BY RX/DR IN RCRD: CPT | Performed by: PHYSICIAN ASSISTANT

## 2025-05-28 PROCEDURE — 1036F TOBACCO NON-USER: CPT | Performed by: PHYSICIAN ASSISTANT

## 2025-05-28 PROCEDURE — 3074F SYST BP LT 130 MM HG: CPT | Performed by: PHYSICIAN ASSISTANT

## 2025-05-28 ASSESSMENT — ENCOUNTER SYMPTOMS
SHORTNESS OF BREATH: 0
SORE THROAT: 1
CHEST TIGHTNESS: 0
VOMITING: 0
HEADACHES: 1
ARTHRALGIAS: 0
FATIGUE: 0
EYE DISCHARGE: 0
EYE ITCHING: 0
DIARRHEA: 0
JOINT SWELLING: 0
EYE PAIN: 0
SINUS PAIN: 0
ABDOMINAL PAIN: 0
WEAKNESS: 0
FEVER: 0
EYE REDNESS: 0
CHILLS: 1
COUGH: 1
RHINORRHEA: 0
DIZZINESS: 0
NAUSEA: 0

## 2025-05-28 ASSESSMENT — PATIENT HEALTH QUESTIONNAIRE - PHQ9
2. FEELING DOWN, DEPRESSED OR HOPELESS: NOT AT ALL
1. LITTLE INTEREST OR PLEASURE IN DOING THINGS: NOT AT ALL
SUM OF ALL RESPONSES TO PHQ9 QUESTIONS 1 AND 2: 0

## 2025-05-28 NOTE — PROGRESS NOTES
"Subjective   Patient ID: Lucho Alexander is a 74 y.o. male. They present today with a chief complaint of Headache and Chills.    History of Present Illness  Pt reports scratchy throat, congestion, mild cough x 4 days. Denies fever, chills, SOB. Possible exposure at hospital. He was in and out of the hospital very often since wife was hospitalized. No aggravating or alleviating factors reported.       Headache  Associated symptoms: congestion, cough and sore throat    Associated symptoms: no abdominal pain, no diarrhea, no dizziness, no ear pain, no eye pain, no fatigue, no fever, no nausea, no vomiting and no weakness        Past Medical History  Allergies as of 05/28/2025 - Reviewed 05/28/2025   Allergen Reaction Noted    Atorvastatin Unknown 07/28/2023       Prescriptions Prior to Admission[1]     Medical History[2]    Surgical History[3]     reports that he has never smoked. He has never used smokeless tobacco. He reports that he does not drink alcohol and does not use drugs.    Review of Systems  Review of Systems   Constitutional:  Positive for chills. Negative for fatigue and fever.   HENT:  Positive for congestion and sore throat. Negative for ear discharge, ear pain, rhinorrhea, sinus pain and sneezing.    Eyes:  Negative for pain, discharge, redness and itching.   Respiratory:  Positive for cough. Negative for chest tightness and shortness of breath.    Cardiovascular:  Negative for chest pain.   Gastrointestinal:  Negative for abdominal pain, diarrhea, nausea and vomiting.   Musculoskeletal:  Negative for arthralgias and joint swelling.   Skin:  Negative for rash.   Neurological:  Positive for headaches. Negative for dizziness and weakness.                                  Objective    Vitals:    05/28/25 1631   BP: 121/78   Pulse: 74   Resp: 16   Temp: 36.9 °C (98.4 °F)   SpO2: 97%   Weight: 80.7 kg (178 lb)   Height: 1.778 m (5' 10\")     No LMP for male patient.    Physical Exam  Constitutional:       " Appearance: Normal appearance.   HENT:      Head: Normocephalic and atraumatic.      Right Ear: Tympanic membrane, ear canal and external ear normal.      Left Ear: Tympanic membrane, ear canal and external ear normal.      Nose: No congestion or rhinorrhea.      Mouth/Throat:      Pharynx: No oropharyngeal exudate or posterior oropharyngeal erythema.   Cardiovascular:      Rate and Rhythm: Normal rate and regular rhythm.      Heart sounds: No murmur heard.  Pulmonary:      Effort: Pulmonary effort is normal. No respiratory distress.      Breath sounds: No stridor. No wheezing, rhonchi or rales.   Lymphadenopathy:      Cervical: No cervical adenopathy.   Skin:     General: Skin is warm and dry.   Neurological:      Mental Status: He is alert and oriented to person, place, and time.   Psychiatric:         Mood and Affect: Mood normal.         Procedures    Point of Care Test & Imaging Results from this visit  Results for orders placed or performed in visit on 05/28/25   POCT SPOTFIRE R/ST Panel Mini w/COVID ([facility]) manually resulted    Specimen: Swab   Result Value Ref Range    POC Sars-Cov-2 PCR Negative Negative    POC Respiratory Syncytial Virus PCR Negative Negative    POC Influenza A Virus PCR Negative Negative    POC Influenza B Virus PCR Negative Negative    POC Human Rhinovirus PCR Negative Negative      Imaging  No results found.    Cardiology, Vascular, and Other Imaging  No other imaging results found for the past 2 days      Diagnostic study results (if any) were reviewed by Blanka Crockett PA-C.    Assessment/Plan   Allergies, medications, history, and pertinent labs/EKGs/Imaging reviewed by Blanka Crockett PA-C.     Medical Decision Making  NAD, vitals wnl, POCT all negative  Suspicious for viral syndrome, Pneumonia less likely        Orders and Diagnoses  Diagnoses and all orders for this visit:  Flu-like symptoms  -     POCT SPOTFIRE R/ST Panel Mini w/COVID ([facility]) manually resulted      Medical Admin  Record      Patient disposition: Home    Electronically signed by Blanka Crockett PA-C  5:02 PM           [1] (Not in a hospital admission)   [2]   Past Medical History:  Diagnosis Date    Encounter for therapeutic drug level monitoring     Medication monitoring encounter    Laceration without foreign body, right lower leg, subsequent encounter 09/09/2020    Laceration of right lower extremity, subsequent encounter    Other chronic diseases of tonsils and adenoids 03/03/2020    Tonsil stone    Overweight 12/02/2021    Overweight with body mass index (BMI) of 27 to 27.9 in adult    Overweight 09/26/2022    Overweight (BMI 25.0-29.9)    Personal history of other diseases of the musculoskeletal system and connective tissue     History of muscle pain    Personal history of other diseases of the musculoskeletal system and connective tissue 10/21/2022    History of lateral epicondylitis of right elbow    Personal history of other specified conditions     History of diarrhea    Personal history of other specified conditions 09/26/2022    History of bradycardia    Strain of muscle and tendon of back wall of thorax, initial encounter 11/03/2022    Strain of thoracic spine    Unspecified hearing loss, unspecified ear 09/19/2019    Decreased hearing    Unspecified injury of head, sequela 09/09/2020    Closed head injury, sequela   [3]   Past Surgical History:  Procedure Laterality Date    OTHER SURGICAL HISTORY  03/28/2022    Cardiac catheterization    OTHER SURGICAL HISTORY  03/29/2022    Colonoscopy    OTHER SURGICAL HISTORY  03/29/2022    Endoscopy

## 2025-06-18 PROCEDURE — RXMED WILLOW AMBULATORY MEDICATION CHARGE

## 2025-06-20 ENCOUNTER — PHARMACY VISIT (OUTPATIENT)
Dept: PHARMACY | Facility: CLINIC | Age: 75
End: 2025-06-20
Payer: MEDICARE

## 2025-07-14 PROBLEM — R41.3 AMNESIA: Status: RESOLVED | Noted: 2023-07-28 | Resolved: 2025-07-14

## 2025-07-14 PROBLEM — J34.3 NASAL TURBINATE HYPERTROPHY: Status: RESOLVED | Noted: 2023-07-28 | Resolved: 2025-07-14

## 2025-07-14 PROBLEM — R35.0 URINARY FREQUENCY: Status: RESOLVED | Noted: 2023-07-28 | Resolved: 2025-07-14

## 2025-07-14 PROBLEM — R26.2 DISABILITY OF WALKING: Status: RESOLVED | Noted: 2024-01-15 | Resolved: 2025-07-14

## 2025-07-14 PROBLEM — M77.11 LATERAL EPICONDYLITIS OF RIGHT ELBOW: Status: RESOLVED | Noted: 2023-07-28 | Resolved: 2025-07-14

## 2025-07-14 PROBLEM — N40.0 BPH (BENIGN PROSTATIC HYPERPLASIA): Status: RESOLVED | Noted: 2023-07-28 | Resolved: 2025-07-14

## 2025-07-14 PROBLEM — S09.90XA CLOSED HEAD INJURY: Status: RESOLVED | Noted: 2023-07-28 | Resolved: 2025-07-14

## 2025-07-14 PROBLEM — S81.811A LACERATION OF RIGHT LOWER EXTREMITY: Status: RESOLVED | Noted: 2023-07-28 | Resolved: 2025-07-14

## 2025-07-14 PROBLEM — S32.009A CLOSED FRACTURE OF TRANSVERSE PROCESS OF LUMBAR VERTEBRA (MULTI): Status: RESOLVED | Noted: 2023-07-28 | Resolved: 2025-07-14

## 2025-07-14 PROBLEM — S06.9XAA TBI (TRAUMATIC BRAIN INJURY) (MULTI): Status: RESOLVED | Noted: 2023-06-26 | Resolved: 2025-07-14

## 2025-07-14 PROBLEM — J35.8 TONSIL STONE: Status: RESOLVED | Noted: 2023-07-28 | Resolved: 2025-07-14

## 2025-07-21 DIAGNOSIS — R35.0 BENIGN PROSTATIC HYPERPLASIA WITH URINARY FREQUENCY: ICD-10-CM

## 2025-07-21 DIAGNOSIS — I10 ESSENTIAL (PRIMARY) HYPERTENSION: ICD-10-CM

## 2025-07-21 DIAGNOSIS — N40.1 BENIGN PROSTATIC HYPERPLASIA WITH URINARY FREQUENCY: ICD-10-CM

## 2025-07-21 DIAGNOSIS — F32.A DEPRESSION, UNSPECIFIED DEPRESSION TYPE: ICD-10-CM

## 2025-07-21 RX ORDER — TAMSULOSIN HYDROCHLORIDE 0.4 MG/1
0.4 CAPSULE ORAL DAILY
Qty: 90 CAPSULE | Refills: 3 | Status: SHIPPED | OUTPATIENT
Start: 2025-07-21

## 2025-07-21 RX ORDER — DULOXETIN HYDROCHLORIDE 30 MG/1
30 CAPSULE, DELAYED RELEASE ORAL DAILY
Qty: 90 CAPSULE | Refills: 3 | Status: SHIPPED | OUTPATIENT
Start: 2025-07-21

## 2025-07-22 RX ORDER — AMLODIPINE BESYLATE 5 MG/1
5 TABLET ORAL DAILY
Qty: 90 TABLET | Refills: 1 | Status: SHIPPED | OUTPATIENT
Start: 2025-07-22

## 2025-08-04 ENCOUNTER — OFFICE VISIT (OUTPATIENT)
Dept: CARDIOLOGY | Facility: CLINIC | Age: 75
End: 2025-08-04
Payer: MEDICARE

## 2025-08-04 VITALS
DIASTOLIC BLOOD PRESSURE: 64 MMHG | SYSTOLIC BLOOD PRESSURE: 124 MMHG | BODY MASS INDEX: 25.97 KG/M2 | HEART RATE: 62 BPM | OXYGEN SATURATION: 98 % | WEIGHT: 181 LBS

## 2025-08-04 DIAGNOSIS — I25.10 ARTERIOSCLEROSIS OF CORONARY ARTERY: ICD-10-CM

## 2025-08-04 DIAGNOSIS — E78.00 PURE HYPERCHOLESTEROLEMIA: ICD-10-CM

## 2025-08-04 DIAGNOSIS — R42 ORTHOSTATIC LIGHTHEADEDNESS: ICD-10-CM

## 2025-08-04 DIAGNOSIS — I10 ESSENTIAL (PRIMARY) HYPERTENSION: ICD-10-CM

## 2025-08-04 DIAGNOSIS — I15.9 BENIGN SECONDARY HYPERTENSION: Primary | ICD-10-CM

## 2025-08-04 DIAGNOSIS — I65.09 VERTEBRAL ARTERY STENOSIS, UNSPECIFIED LATERALITY: ICD-10-CM

## 2025-08-04 DIAGNOSIS — G47.30 SLEEP APNEA, UNSPECIFIED TYPE: ICD-10-CM

## 2025-08-04 DIAGNOSIS — R00.1 BRADYCARDIA: ICD-10-CM

## 2025-08-04 PROCEDURE — 99212 OFFICE O/P EST SF 10 MIN: CPT

## 2025-08-04 PROCEDURE — 1159F MED LIST DOCD IN RCRD: CPT | Performed by: INTERNAL MEDICINE

## 2025-08-04 PROCEDURE — 99214 OFFICE O/P EST MOD 30 MIN: CPT | Performed by: INTERNAL MEDICINE

## 2025-08-04 PROCEDURE — 3074F SYST BP LT 130 MM HG: CPT | Performed by: INTERNAL MEDICINE

## 2025-08-04 PROCEDURE — G2211 COMPLEX E/M VISIT ADD ON: HCPCS | Performed by: INTERNAL MEDICINE

## 2025-08-04 PROCEDURE — 3078F DIAST BP <80 MM HG: CPT | Performed by: INTERNAL MEDICINE

## 2025-08-04 PROCEDURE — 1160F RVW MEDS BY RX/DR IN RCRD: CPT | Performed by: INTERNAL MEDICINE

## 2025-08-04 PROCEDURE — 1036F TOBACCO NON-USER: CPT | Performed by: INTERNAL MEDICINE

## 2025-08-04 RX ORDER — AMLODIPINE BESYLATE 5 MG/1
2.5 TABLET ORAL DAILY
Qty: 90 TABLET | Refills: 1 | Status: SHIPPED | OUTPATIENT
Start: 2025-08-04

## 2025-08-04 NOTE — PROGRESS NOTES
Chief Complaint:   6 month follow up , Hypertension, Bradycardia, and Heart Failure     History Of Present Illness:    Lucho Alexander is a 74 y.o. male presenting for general cardiac follow up  Taking care of wife  Some orthostatic LH  Patient denies chest pain/SOB/dizziness/lightheadedness/edema/claudication/palpitations  Activity limited but cuts grass    Last Recorded Vitals:  Vitals:    08/04/25 0943   BP: 124/64   BP Location: Left arm   Patient Position: Sitting   BP Cuff Size: Adult   Pulse: 62   SpO2: 98%   Weight: 82.1 kg (181 lb)            Allergies:  Atorvastatin    Outpatient Medications:  Current Outpatient Medications   Medication Instructions    amLODIPine (NORVASC) 2.5 mg, oral, Daily    Arexvy, PF, 120 mcg/0.5 mL suspension for reconstitution     aspirin 81 mg EC tablet Take by mouth.    coenzyme Q-10 300 mg capsule capsule Daily    Comirnaty 2023-24, 12y up,,PF, 30 mcg/0.3 mL suspension     DULoxetine (CYMBALTA) 30 mg, oral, Daily    Fluzone HighDose Quad 23-24 PF syringe     ibuprofen 800 mg tablet 1 tablet, Every 6 hours    melatonin 3 mg, Nightly    multivit-min-FA-lycopen-lutein (Essential Man 50 Plus) 0.4-2-250 mg-mg-mcg tablet 1 tablet, Daily    pantoprazole (ProtoNix) 40 mg EC tablet 1 tablet, Every 12 hours scheduled (0630,1830)    Praluent Pen 75 mg, subcutaneous, Every 14 days    psyllium (MetamuciL) 0.4 gram capsule Take by mouth.    tamsulosin (FLOMAX) 0.4 mg, oral, Daily       Physical Exam:  Constitutional:       Appearance: Healthy appearance. Not in distress.   Neck:      Vascular: No JVR. JVD normal.   Pulmonary:      Effort: Pulmonary effort is normal.      Breath sounds: Normal breath sounds. No wheezing. No rhonchi. No rales.   Chest:      Chest wall: Not tender to palpatation.   Cardiovascular:      PMI at left midclavicular line. Normal rate. Regular rhythm. Normal S1. Normal S2.       Murmurs: There is no murmur.      No gallop.  No click. No rub.   Pulses:     Intact  distal pulses.   Edema:     Peripheral edema absent.   Abdominal:      General: Bowel sounds are normal.      Palpations: Abdomen is soft.      Tenderness: There is no abdominal tenderness.   Musculoskeletal: Normal range of motion.         General: No tenderness. Skin:     General: Skin is warm and dry.   Neurological:      General: No focal deficit present.      Mental Status: Alert and oriented to person, place and time.        Last Labs:  CBC -  Lab Results   Component Value Date    WBC 8.8 10/10/2024    HGB 15.2 10/10/2024    HCT 46.1 10/10/2024    MCV 95 10/10/2024     10/10/2024       CMP -  Lab Results   Component Value Date    CALCIUM 9.6 10/10/2024    PROT 7.0 10/10/2024    ALBUMIN 4.5 10/10/2024    AST 18 10/10/2024    ALT 20 10/10/2024    ALKPHOS 65 10/10/2024    BILITOT 0.6 10/10/2024       LIPID PANEL -   Lab Results   Component Value Date    CHOL 188 10/10/2024    TRIG 113 10/10/2024    HDL 76.4 10/10/2024    CHHDL 2.5 10/10/2024    LDLF 50 09/29/2023    VLDL 23 10/10/2024    NHDL 112 10/10/2024     Ldl=89  RENAL FUNCTION PANEL -   Lab Results   Component Value Date    GLUCOSE 108 (H) 10/10/2024     10/10/2024    K 4.0 10/10/2024     10/10/2024    CO2 35 (H) 10/10/2024    ANIONGAP 10 10/10/2024    BUN 22 10/10/2024    CREATININE 0.81 10/10/2024    GFRMALE 79 02/08/2022    CALCIUM 9.6 10/10/2024    ALBUMIN 4.5 10/10/2024        Lab Results   Component Value Date    BNP 49 12/26/2023    HGBA1C 5.7 (H) 12/26/2023    HGBA1C 4.5 12/02/2021           Lab review: I have personally reviewed the laboratory result(s)       Problem List Items Addressed This Visit       Arteriosclerosis of coronary artery    Overview   Per 2007 cath - moderate 3 vessel dz  Had [facility] admission 12/2023-R/O for MI.  1/2024 stress test with average exercise tolerance with NL perfusion/NL LVEF  NO angina/ischemic EKG changes  Continue ASA / statin  No beta blocker with prior bradycardia         Relevant Medications     amLODIPine (Norvasc) 5 mg tablet    Benign secondary hypertension - Primary    Overview   BP stable         Relevant Medications    amLODIPine (Norvasc) 5 mg tablet    Bradycardia    Overview   Asymptomatic  On no negative chronotropic meds         Hyperlipidemia    Overview   Was on Livalo/Zetia with 9/2019 LDL=71 (had not tolerated high intensity statins). Changed Livalo to pravastatin because of insurance.  5/2020 LDL=86,increased pravastatin  Then on pravastatin / ezetimibe  10/2022 GSE=411  Started PCSK9 inhibitor (Praluent)   10/2024 lipids OK         Sleep apnea    Overview   On Bi Pap         Vertebral artery narrowing    Overview   Noted on 1 /2024 scan at [facility]  No dizziness/LH  Control BP  On ASA/statin           Relevant Medications    amLODIPine (Norvasc) 5 mg tablet    Orthostatic lightheadedness    Overview   Will decrease amlodipine          Other Visit Diagnoses         Essential (primary) hypertension        Relevant Medications    amLODIPine (Norvasc) 5 mg tablet        Decrease amlodipine to 1/2 tablet(2.5 mg) daily as you get LH  Stay active  lipids  Robb Rico, DO

## 2025-10-14 ENCOUNTER — APPOINTMENT (OUTPATIENT)
Dept: PRIMARY CARE | Facility: CLINIC | Age: 75
End: 2025-10-14
Payer: MEDICARE